# Patient Record
Sex: MALE | Race: WHITE | NOT HISPANIC OR LATINO | Employment: FULL TIME | URBAN - METROPOLITAN AREA
[De-identification: names, ages, dates, MRNs, and addresses within clinical notes are randomized per-mention and may not be internally consistent; named-entity substitution may affect disease eponyms.]

---

## 2018-12-17 ENCOUNTER — OFFICE VISIT (OUTPATIENT)
Dept: URGENT CARE | Facility: CLINIC | Age: 23
End: 2018-12-17
Payer: OTHER GOVERNMENT

## 2018-12-17 VITALS
HEIGHT: 69 IN | RESPIRATION RATE: 16 BRPM | DIASTOLIC BLOOD PRESSURE: 78 MMHG | SYSTOLIC BLOOD PRESSURE: 122 MMHG | BODY MASS INDEX: 25.03 KG/M2 | WEIGHT: 169 LBS | OXYGEN SATURATION: 97 % | TEMPERATURE: 98.5 F | HEART RATE: 72 BPM

## 2018-12-17 DIAGNOSIS — J98.8 RESPIRATORY TRACT INFECTION: ICD-10-CM

## 2018-12-17 PROCEDURE — 99203 OFFICE O/P NEW LOW 30 MIN: CPT | Performed by: NURSE PRACTITIONER

## 2018-12-17 RX ORDER — AZITHROMYCIN 250 MG/1
TABLET, FILM COATED ORAL
Qty: 6 TAB | Refills: 0 | Status: SHIPPED | OUTPATIENT
Start: 2018-12-17 | End: 2019-05-15

## 2018-12-17 ASSESSMENT — ENCOUNTER SYMPTOMS
MYALGIAS: 0
WHEEZING: 0
NAUSEA: 0
CHILLS: 0
VOMITING: 0
BLURRED VISION: 0
DIARRHEA: 0
DIZZINESS: 0
HEADACHES: 0
DOUBLE VISION: 0
SORE THROAT: 1
MUSCULOSKELETAL NEGATIVE: 1
COUGH: 1
SHORTNESS OF BREATH: 0
ABDOMINAL PAIN: 0
STRIDOR: 0
PALPITATIONS: 0
CONSTIPATION: 0
FEVER: 0

## 2018-12-17 NOTE — LETTER
December 17, 2018         Patient: Az Morris   YOB: 1995   Date of Visit: 12/17/2018           To Whom it May Concern:    Az Morris was seen in my clinic on 12/17/2018 due to illness. He may return to work on 12/20/2018.  If you have any questions or concerns, please don't hesitate to call.        Sincerely,           BLAIRE Scott.  Electronically Signed

## 2018-12-17 NOTE — PROGRESS NOTES
"Subjective:   Az Morris is a 23 y.o. male who presents for Cold Symptoms (body aches, sore throat, x1wk) and Letter for School/Work (needs excuse for work)        HPI   Patient presents with new onset sore throat, congestion, and cough that started week ago. He states the cough is intermittent, productive with yellow sputum. No worsening with timing. Mild, constant sore throat. Denies taking any OTC cold remedies. Denies alleviating or aggravating factors.      Has received flu vaccine this season.    Denies any medical hx.    Review of Systems   Constitutional: Negative for chills and fever.   HENT: Positive for congestion and sore throat. Negative for ear discharge and ear pain.    Eyes: Negative for blurred vision and double vision.   Respiratory: Positive for cough. Negative for shortness of breath, wheezing and stridor.    Cardiovascular: Negative for chest pain and palpitations.   Gastrointestinal: Negative for abdominal pain, constipation, diarrhea, nausea and vomiting.   Musculoskeletal: Negative.  Negative for myalgias.   Skin: Negative.  Negative for itching and rash.   Neurological: Negative for dizziness and headaches.   All other systems reviewed and are negative.    PMH:  has no past medical history on file.  MEDS:   Current Outpatient Prescriptions:   •  azithromycin (ZITHROMAX) 250 MG Tab, Take two tabs po day one followed by one tab po day two through five with food, Disp: 6 Tab, Rfl: 0  ALLERGIES:   Allergies   Allergen Reactions   • Penicillins      rash     SURGHX: History reviewed. No pertinent surgical history.  SOCHX:  reports that he has never smoked. His smokeless tobacco use includes Chew. He reports that he does not drink alcohol.  FH: Family history was reviewed, no pertinent findings to report       Objective:   /78 (BP Location: Left arm, Patient Position: Sitting, BP Cuff Size: Adult)   Pulse 72   Temp 36.9 °C (98.5 °F)   Resp 16   Ht 1.753 m (5' 9\")   Wt 76.7 kg (169 " lb)   SpO2 97%   BMI 24.96 kg/m²   Physical Exam   Constitutional: He is oriented to person, place, and time. He appears well-developed and well-nourished. No distress.   HENT:   Head: Normocephalic.   Right Ear: Hearing and ear canal normal. Tympanic membrane is not erythematous. A middle ear effusion is present.   Left Ear: Hearing and ear canal normal. Tympanic membrane is not erythematous. A middle ear effusion is present.   Nose: No rhinorrhea. Right sinus exhibits no maxillary sinus tenderness and no frontal sinus tenderness. Left sinus exhibits no maxillary sinus tenderness and no frontal sinus tenderness.   Mouth/Throat: Mucous membranes are normal. Posterior oropharyngeal erythema present. Tonsils are 2+ on the right. Tonsils are 2+ on the left. No tonsillar exudate.   Eyes: Pupils are equal, round, and reactive to light. Conjunctivae, EOM and lids are normal.   Neck: Normal range of motion. No thyromegaly present.   Cardiovascular: Normal rate, regular rhythm and normal heart sounds.    Pulmonary/Chest: Effort normal. No respiratory distress. He has no decreased breath sounds. He has wheezes in the left lower field.   Able to clear wheezing with cough   Abdominal: Soft. Bowel sounds are normal. There is no hepatosplenomegaly.   Lymphadenopathy:        Head (right side): No submandibular and no tonsillar adenopathy present.        Head (left side): No submandibular and no tonsillar adenopathy present.     He has no cervical adenopathy.   Neurological: He is alert and oriented to person, place, and time.   Skin: Skin is warm and dry. No rash noted. He is not diaphoretic.   Psychiatric: He has a normal mood and affect. His behavior is normal. Judgment and thought content normal.   Vitals reviewed.        Assessment/Plan:   Assessment    1. Respiratory tract infection  - azithromycin (ZITHROMAX) 250 MG Tab; Take two tabs po day one followed by one tab po day two through five with food  Dispense: 6 Tab;  Refill: 0    Patient encouraged to increase clear liquid intake    Denies need for inhaler    Differential diagnosis, natural history, supportive care, and indications for immediate follow-up discussed.

## 2019-05-10 ENCOUNTER — OFFICE VISIT (OUTPATIENT)
Dept: URGENT CARE | Facility: PHYSICIAN GROUP | Age: 24
End: 2019-05-10
Payer: OTHER GOVERNMENT

## 2019-05-10 VITALS
TEMPERATURE: 99.5 F | BODY MASS INDEX: 25.53 KG/M2 | HEIGHT: 69 IN | OXYGEN SATURATION: 99 % | HEART RATE: 62 BPM | WEIGHT: 172.4 LBS | SYSTOLIC BLOOD PRESSURE: 130 MMHG | DIASTOLIC BLOOD PRESSURE: 80 MMHG

## 2019-05-10 DIAGNOSIS — T14.8XXA MUSCLE STRAIN: ICD-10-CM

## 2019-05-10 PROCEDURE — 99214 OFFICE O/P EST MOD 30 MIN: CPT | Performed by: PHYSICIAN ASSISTANT

## 2019-05-11 ASSESSMENT — ENCOUNTER SYMPTOMS
FOCAL WEAKNESS: 0
TREMORS: 0
CHILLS: 0
SHORTNESS OF BREATH: 0
SEIZURES: 0
SPEECH CHANGE: 0
BLURRED VISION: 0
LOSS OF CONSCIOUSNESS: 0
TINGLING: 0
HEADACHES: 0
PALPITATIONS: 0
COUGH: 0
DIZZINESS: 0
SENSORY CHANGE: 0
FEVER: 0
DOUBLE VISION: 0
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
## 2019-05-15 ENCOUNTER — OFFICE VISIT (OUTPATIENT)
Dept: URGENT CARE | Facility: PHYSICIAN GROUP | Age: 24
End: 2019-05-15
Payer: OTHER GOVERNMENT

## 2019-05-15 VITALS
WEIGHT: 172 LBS | SYSTOLIC BLOOD PRESSURE: 132 MMHG | RESPIRATION RATE: 16 BRPM | DIASTOLIC BLOOD PRESSURE: 78 MMHG | BODY MASS INDEX: 25.48 KG/M2 | TEMPERATURE: 98.3 F | HEART RATE: 80 BPM | HEIGHT: 69 IN | OXYGEN SATURATION: 99 %

## 2019-05-15 DIAGNOSIS — B00.1 COLD SORE: Primary | ICD-10-CM

## 2019-05-15 PROCEDURE — 99214 OFFICE O/P EST MOD 30 MIN: CPT | Performed by: PHYSICIAN ASSISTANT

## 2019-05-15 RX ORDER — VALACYCLOVIR HYDROCHLORIDE 1 G/1
2000 TABLET, FILM COATED ORAL 2 TIMES DAILY
Qty: 4 TAB | Refills: 2 | Status: SHIPPED | OUTPATIENT
Start: 2019-05-15 | End: 2019-05-16

## 2019-05-15 ASSESSMENT — ENCOUNTER SYMPTOMS
NAUSEA: 0
SHORTNESS OF BREATH: 0
DIARRHEA: 0
VOMITING: 0
CHILLS: 0
ABDOMINAL PAIN: 0
CONSTIPATION: 0
FEVER: 0
COUGH: 0

## 2019-05-16 NOTE — PATIENT INSTRUCTIONS
Valacyclovir caplets  What is this medicine?  VALACYCLOVIR (mariluz ay EVELYN saez veer) is an antiviral medicine. It is used to treat or prevent infections caused by certain kinds of viruses. Examples of these infections include herpes and shingles. This medicine will not cure herpes.  This medicine may be used for other purposes; ask your health care provider or pharmacist if you have questions.  COMMON BRAND NAME(S): Valtrex  What should I tell my health care provider before I take this medicine?  They need to know if you have any of these conditions:  -acquired immunodeficiency syndrome (AIDS)  -any other condition that may weaken the immune system  -bone marrow or kidney transplant  -kidney disease  -an unusual or allergic reaction to valacyclovir, acyclovir, ganciclovir, valganciclovir, other medicines, foods, dyes, or preservatives  -pregnant or trying to get pregnant  -breast-feeding  How should I use this medicine?  Take this medicine by mouth with a glass of water. Follow the directions on the prescription label. You can take this medicine with or without food. Take your doses at regular intervals. Do not take your medicine more often than directed. Finish the full course prescribed by your doctor or health care professional even if you think your condition is better. Do not stop taking except on the advice of your doctor or health care professional.  Talk to your pediatrician regarding the use of this medicine in children. While this drug may be prescribed for children as young as 2 years for selected conditions, precautions do apply.  Overdosage: If you think you have taken too much of this medicine contact a poison control center or emergency room at once.  NOTE: This medicine is only for you. Do not share this medicine with others.  What if I miss a dose?  If you miss a dose, take it as soon as you can. If it is almost time for your next dose, take only that dose. Do not take double or extra doses.  What may  interact with this medicine?  -cimetidine  -probenecid  This list may not describe all possible interactions. Give your health care provider a list of all the medicines, herbs, non-prescription drugs, or dietary supplements you use. Also tell them if you smoke, drink alcohol, or use illegal drugs. Some items may interact with your medicine.  What should I watch for while using this medicine?  Tell your doctor or health care professional if your symptoms do not start to get better after 1 week.  This medicine works best when taken early in the course of an infection, within the first 72 hours. Begin treatment as soon as possible after the first signs of infection like tingling, itching, or pain in the affected area.  It is possible that genital herpes may still be spread even when you are not having symptoms. Always use safer sex practices like condoms made of latex or polyurethane whenever you have sexual contact.  You should stay well hydrated while taking this medicine. Drink plenty of fluids.  What side effects may I notice from receiving this medicine?  Side effects that you should report to your doctor or health care professional as soon as possible:  -allergic reactions like skin rash, itching or hives, swelling of the face, lips, or tongue  -aggressive behavior  -confusion  -hallucinations  -problems with balance, talking, walking  -stomach pain  -tremor  -trouble passing urine or change in the amount of urine  Side effects that usually do not require medical attention (report to your doctor or health care professional if they continue or are bothersome):  -dizziness  -headache  -nausea, vomiting  This list may not describe all possible side effects. Call your doctor for medical advice about side effects. You may report side effects to FDA at 0-060-FDA-9393.  Where should I keep my medicine?  Keep out of the reach of children.  Store at room temperature between 15 and 25 degrees C (59 and 77 degrees F). Keep  container tightly closed. Throw away any unused medicine after the expiration date.  NOTE: This sheet is a summary. It may not cover all possible information. If you have questions about this medicine, talk to your doctor, pharmacist, or health care provider.  © 2018 [employer] (2013-12-03 16:34:05)

## 2019-05-16 NOTE — PROGRESS NOTES
"Subjective:   Az Morris is a 24 y.o. male who presents for Cold Sores (Out of medicine.  Valcyclovir)        The patient presents to clinic today for a refill on his Valtrex.  He has a history of cold sores well-controlled with Valtrex in the past.  He denies complication with medication.  He does not currently have a cold sore.  No fever, chills.  No other aggravating or alleviating factors.      Review of Systems   Constitutional: Negative for chills, fever and malaise/fatigue.   Respiratory: Negative for cough and shortness of breath.    Gastrointestinal: Negative for abdominal pain, constipation, diarrhea, nausea and vomiting.   Skin: Negative for itching and rash.   All other systems reviewed and are negative.      PMH:  has no past medical history on file.  MEDS:   Current Outpatient Prescriptions:   •  valacyclovir (VALTREX) 1 GM Tab, Take 2 Tabs by mouth 2 times a day for 1 day., Disp: 4 Tab, Rfl: 2  ALLERGIES:   Allergies   Allergen Reactions   • Penicillins      rash     SURGHX: History reviewed. No pertinent surgical history.  SOCHX:  reports that he has never smoked. His smokeless tobacco use includes Chew. He reports that he drinks alcohol.  History reviewed. No pertinent family history.     Objective:   /78   Pulse 80   Temp 36.8 °C (98.3 °F) (Temporal)   Resp 16   Ht 1.753 m (5' 9\")   Wt 78 kg (172 lb)   SpO2 99%   BMI 25.40 kg/m²     Physical Exam   Constitutional: He is oriented to person, place, and time. He appears well-developed and well-nourished. No distress.   HENT:   Head: Normocephalic and atraumatic.   Nose: Nose normal.   Mouth/Throat: Uvula is midline, oropharynx is clear and moist and mucous membranes are normal. No posterior oropharyngeal erythema.   Eyes: Pupils are equal, round, and reactive to light. Conjunctivae are normal.   Neck: Normal range of motion. Neck supple. No tracheal deviation present.   Cardiovascular: Normal rate and regular rhythm.  "   Pulmonary/Chest: Effort normal and breath sounds normal.   Lymphadenopathy:     He has no cervical adenopathy.   Neurological: He is alert and oriented to person, place, and time.   Skin: Skin is warm and dry. Capillary refill takes less than 2 seconds.   Psychiatric: He has a normal mood and affect. His behavior is normal.   Vitals reviewed.        Assessment/Plan:     1. Cold sore  valacyclovir (VALTREX) 1 GM Tab    REFERRAL TO FOLLOW-UP WITH PRIMARY CARE     Medication refill provided.  Educational handout on Valtrex provided.  We discussed long-term standing order will require PCP orders.  A referral was placed to follow-up and establish with PCP.      If symptoms worsen or persist patient can return to clinic for reevaluation. All side effects of medication discussed including allergic response, GI upset, tendon injury, etc. Patient verbalized understanding of information.    Please note that this dictation was created using voice recognition software. I have made every reasonable attempt to correct obvious errors, but I expect that there are errors of grammar and possibly content that I did not discover before finalizing the note.

## 2020-09-16 ENCOUNTER — TELEPHONE (OUTPATIENT)
Dept: SCHEDULING | Facility: IMAGING CENTER | Age: 25
End: 2020-09-16

## 2020-11-03 ENCOUNTER — OFFICE VISIT (OUTPATIENT)
Dept: MEDICAL GROUP | Facility: MEDICAL CENTER | Age: 25
End: 2020-11-03
Payer: OTHER GOVERNMENT

## 2020-11-03 VITALS
HEIGHT: 69 IN | TEMPERATURE: 98.7 F | HEART RATE: 61 BPM | DIASTOLIC BLOOD PRESSURE: 84 MMHG | BODY MASS INDEX: 23.84 KG/M2 | RESPIRATION RATE: 14 BRPM | WEIGHT: 160.94 LBS | SYSTOLIC BLOOD PRESSURE: 124 MMHG | OXYGEN SATURATION: 100 %

## 2020-11-03 DIAGNOSIS — M25.562 CHRONIC PAIN OF BOTH KNEES: ICD-10-CM

## 2020-11-03 DIAGNOSIS — G89.29 CHRONIC PAIN OF BOTH KNEES: ICD-10-CM

## 2020-11-03 DIAGNOSIS — Z72.0 TOBACCO USE: ICD-10-CM

## 2020-11-03 DIAGNOSIS — M54.41 CHRONIC BILATERAL LOW BACK PAIN WITH BILATERAL SCIATICA: ICD-10-CM

## 2020-11-03 DIAGNOSIS — M25.561 CHRONIC PAIN OF BOTH KNEES: ICD-10-CM

## 2020-11-03 DIAGNOSIS — M54.42 CHRONIC BILATERAL LOW BACK PAIN WITH BILATERAL SCIATICA: ICD-10-CM

## 2020-11-03 DIAGNOSIS — G89.29 CHRONIC BILATERAL LOW BACK PAIN WITH BILATERAL SCIATICA: ICD-10-CM

## 2020-11-03 PROBLEM — M54.40 CHRONIC BILATERAL LOW BACK PAIN WITH SCIATICA: Status: ACTIVE | Noted: 2020-11-03

## 2020-11-03 PROCEDURE — 99204 OFFICE O/P NEW MOD 45 MIN: CPT | Performed by: FAMILY MEDICINE

## 2020-11-03 SDOH — HEALTH STABILITY: MENTAL HEALTH: HOW MANY STANDARD DRINKS CONTAINING ALCOHOL DO YOU HAVE ON A TYPICAL DAY?: 1 OR 2

## 2020-11-03 SDOH — HEALTH STABILITY: MENTAL HEALTH: HOW OFTEN DO YOU HAVE A DRINK CONTAINING ALCOHOL?: 2-4 TIMES A MONTH

## 2020-11-03 ASSESSMENT — PATIENT HEALTH QUESTIONNAIRE - PHQ9: CLINICAL INTERPRETATION OF PHQ2 SCORE: 0

## 2020-11-03 NOTE — PROGRESS NOTES
Subjective:     CC:  Diagnoses of Tobacco use, Chronic bilateral low back pain with bilateral sciatica, and Chronic pain of both knees were pertinent to this visit.    HISTORY OF THE PRESENT ILLNESS: Patient is a 25 y.o. male. He is here today to establish care. He works for [employer], has a girlfriend, no children.  No family history of colon or prostate cancer.      Tobacco use  Patient started chewing about 5 years ago,  Patient quit for about 6 months about two years ago. He quit cold turkey. He is not interested in quitting at this time.    Chronic bilateral low back pain with sciatica  This is a chronic problem.  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  He reports intermittent, bilateral, aching pain since that time. He is currently using ibuprofen which provides some relief.  He reports the pain has radiated down both legs before however this has not happened recently.  He denies lower extremity weakness or numbness.  No bowel or bladder incontinence.    Chronic pain of both knees  Chronic bilateral knee pain X 2 years. He reports the pain started during training for the [employer]. He reports intermittent aching pain, occasional sharp, stabbing pain when he is exercising.  He denies catching, clicking, or locking.  No previous imaging or physical therapy.  His pain is relieved with ibuprofen.      Allergies: Penicillins    No current [facility]-ordered outpatient medications on file.     No current [facility]-ordered facility-administered medications on file.        History reviewed. No pertinent past medical history.    Past Surgical History:   Procedure Laterality Date   • DENTAL EXTRACTION(S)      wisdom teeth       Social History     Tobacco Use   • Smoking status: Former Smoker     Types: Cigarettes   • Smokeless tobacco: Current  "User     Types: Chew   Substance Use Topics   • Alcohol use: Yes     Frequency: 2-4 times a month     Drinks per session: 1 or 2     Comment: Occ   • Drug use: Never       Social History     Social History Narrative   • Not on file       Family History   Problem Relation Age of Onset   • No Known Problems Mother    • No Known Problems Father        Health Maintenance: See above    ROS:   Gen: no fevers/chills, no changes in weight  Eyes: no changes in vision  ENT: no sore throat or nasal congestion  Pulm: no sob, no cough  CV: no chest pain  GI: no nausea/vomiting, no diarrhea or constipation  : no dysuria  MSk: see above  Skin: no rash  Neuro: no headaches, no numbness/tingling  Immuno: no seasonal allergies  Heme/Lymph: no easy bruising  Psych: no anxiety of depression      Objective:     Exam: /84 (BP Location: Left arm, Patient Position: Sitting, BP Cuff Size: Adult long)   Pulse 61   Temp 37.1 °C (98.7 °F) (Temporal)   Resp 14   Ht 1.753 m (5' 9\")   Wt 73 kg (160 lb 15 oz)   SpO2 100%  Body mass index is 23.77 kg/m².    General: Well-developed, well-nourished. Appears stated age.  Eyes: Normocephalic. Conjunctiva clear without injection or scleral icterus. Pupils equal and reactive to light.   HENT: Normocephalic, atraumatic. Ears normal shape and contour, canals are clear bilaterally, tympanic membranes pearly, oropharynx is clear without erythema, edema or exudates.   Neck: Supple; no obvious thyromegaly or nodules appreciated  Pulmonary: Clear to ausculation bilaterally.  No increased work of breathing. No rales, ronchi, or wheezing.  Cardiovascular: Regular rate and rhythm without murmur.  Abdomen: Soft, nontender, nondistended. Normal bowel sounds. No guarding or rebound tenderness.  Neurologic: CN III-XII intact.  Lymph: No cervical or supraclavicular lymphadenopathy palpated.  Skin: Warm and dry.  No obvious lesions.  Musculoskeletal: Normal gait. No extremity cyanosis, clubbing, or " edema.  Back: No spinal tenderness  Knees:  no edema, ecchymosis, or lesions on inspection, no effusion noted, no tenderness to palpation. Full ROM with flexion and extension, strength 5/5 on extension, strength 5/5 on flexion. No pain with varus/valgus stress, Neg anterior drawer, Neg posterior drawer, Neg Lachman test  Psych: Euthymic affect. Alert and oriented x 3. Judgment and insight is normal.      Assessment & Plan:   25 y.o. male with the following -    1. Tobacco use  - Cessation counseling provided, patient is not considering quitting at this time    2. Chronic bilateral low back pain with bilateral sciatica  - REFERRAL TO PHYSICAL THERAPY  - DX-LUMBAR SPINE-2 OR 3 VIEWS; Future  - DX-THORACIC SPINE-2 VIEWS; Future  - Continue NSAIDS PRN, discussed GI precautions    3. Chronic pain of both knees  - REFERRAL TO PHYSICAL THERAPY  - DX-KNEE COMPLETE 4+ LEFT; Future  - DX-KNEE COMPLETE 4+ RIGHT; Future  - Continue NSAIDS PRN, discussed GI precautions    Return in about 6 weeks (around 12/15/2020) for back and knee pain.    Please note this dictation was created using voice recognition software. I have made every reasonable attempt to correct obvious errors, but I expect there may be errors of grammar, and possibly content, that I did not discover before finalizing the note.

## 2020-11-03 NOTE — ASSESSMENT & PLAN NOTE
This is a chronic problem.  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  He reports intermittent, bilateral, aching pain since that time. He is currently using ibuprofen which provides some relief.  He reports the pain has radiated down both legs before however this has not happened recently.  He denies lower extremity weakness or numbness.  No bowel or bladder incontinence.

## 2020-11-03 NOTE — ASSESSMENT & PLAN NOTE
Chronic bilateral knee pain X 2 years. He reports the pain started during training for the [employer]. He reports intermittent aching pain, occasional sharp, stabbing pain when he is exercising.  He denies catching, clicking, or locking.  No previous imaging or physical therapy.  His pain is relieved with ibuprofen.

## 2020-11-03 NOTE — ASSESSMENT & PLAN NOTE
Patient started chewing about 5 years ago,  Patient quit for about 6 months about two years ago. He quit cold turkey. He is not interested in quitting at this time.

## 2020-11-10 ENCOUNTER — HOSPITAL ENCOUNTER (OUTPATIENT)
Dept: RADIOLOGY | Facility: MEDICAL CENTER | Age: 25
End: 2020-11-10
Attending: FAMILY MEDICINE
Payer: OTHER GOVERNMENT

## 2020-11-10 DIAGNOSIS — M54.41 CHRONIC BILATERAL LOW BACK PAIN WITH BILATERAL SCIATICA: ICD-10-CM

## 2020-11-10 DIAGNOSIS — G89.29 CHRONIC PAIN OF BOTH KNEES: ICD-10-CM

## 2020-11-10 DIAGNOSIS — G89.29 CHRONIC BILATERAL LOW BACK PAIN WITH BILATERAL SCIATICA: ICD-10-CM

## 2020-11-10 DIAGNOSIS — M25.562 CHRONIC PAIN OF BOTH KNEES: ICD-10-CM

## 2020-11-10 DIAGNOSIS — M54.42 CHRONIC BILATERAL LOW BACK PAIN WITH BILATERAL SCIATICA: ICD-10-CM

## 2020-11-10 DIAGNOSIS — M25.561 CHRONIC PAIN OF BOTH KNEES: ICD-10-CM

## 2020-11-10 PROCEDURE — 72100 X-RAY EXAM L-S SPINE 2/3 VWS: CPT

## 2020-11-10 PROCEDURE — 72070 X-RAY EXAM THORAC SPINE 2VWS: CPT

## 2020-11-10 PROCEDURE — 73564 X-RAY EXAM KNEE 4 OR MORE: CPT | Mod: LT

## 2020-11-10 PROCEDURE — 73564 X-RAY EXAM KNEE 4 OR MORE: CPT | Mod: RT

## 2020-11-18 DIAGNOSIS — M54.42 CHRONIC BILATERAL LOW BACK PAIN WITH BILATERAL SCIATICA: ICD-10-CM

## 2020-11-18 DIAGNOSIS — M54.41 CHRONIC BILATERAL LOW BACK PAIN WITH BILATERAL SCIATICA: ICD-10-CM

## 2020-11-18 DIAGNOSIS — G89.29 CHRONIC BILATERAL LOW BACK PAIN WITH BILATERAL SCIATICA: ICD-10-CM

## 2020-12-16 ENCOUNTER — TELEMEDICINE (OUTPATIENT)
Dept: MEDICAL GROUP | Facility: MEDICAL CENTER | Age: 25
End: 2020-12-16
Payer: OTHER GOVERNMENT

## 2020-12-16 VITALS — WEIGHT: 155 LBS | BODY MASS INDEX: 22.96 KG/M2 | HEIGHT: 69 IN

## 2020-12-16 DIAGNOSIS — G89.29 CHRONIC BILATERAL LOW BACK PAIN WITH BILATERAL SCIATICA: ICD-10-CM

## 2020-12-16 DIAGNOSIS — M54.41 CHRONIC BILATERAL LOW BACK PAIN WITH BILATERAL SCIATICA: ICD-10-CM

## 2020-12-16 DIAGNOSIS — G89.29 CHRONIC PAIN OF BOTH KNEES: ICD-10-CM

## 2020-12-16 DIAGNOSIS — M25.561 CHRONIC PAIN OF BOTH KNEES: ICD-10-CM

## 2020-12-16 DIAGNOSIS — M54.42 CHRONIC BILATERAL LOW BACK PAIN WITH BILATERAL SCIATICA: ICD-10-CM

## 2020-12-16 DIAGNOSIS — M25.562 CHRONIC PAIN OF BOTH KNEES: ICD-10-CM

## 2020-12-16 PROCEDURE — 99999 PR NO CHARGE: CPT | Mod: CR | Performed by: FAMILY MEDICINE

## 2020-12-16 NOTE — PROGRESS NOTES
"Telemedicine Visit: Established Patient     This evaluation was conducted via Zoom using secure and encrypted videoconferencing technology. The patient was in a private location in the state of [place].    The patient's identity was confirmed and verbal consent was obtained for this virtual visit.    Subjective:   CC: f/u back and knee pain  Az Morris is a 25 y.o. male presenting for evaluation and management of:    The patient presents for follow-up for chronic low back and bilateral knee pain.  His symptoms have not changed at all since her last appointment.  Xrays of lumbar spine and bilateral knees 11/10/20 unremarkable. Unfortunately he did not receive a return call from the physical therapy office for 2 weeks and has this not participated in physical therapy.  He would like to try physical therapy and schedule a visit in a month to evaluate improvement in symptoms s/p therapy.      Allergies   Allergen Reactions   • Penicillins      rash       Current medicines (including changes today)  No current outpatient medications on file.     No current facility-administered medications for this visit.        Patient Active Problem List    Diagnosis Date Noted   • Tobacco use 11/03/2020   • Chronic bilateral low back pain with sciatica 11/03/2020   • Chronic pain of both knees 11/03/2020       Family History   Problem Relation Age of Onset   • No Known Problems Mother    • No Known Problems Father        He  has no past medical history on file.  He  has a past surgical history that includes dental extraction(s).       Objective:   Ht 1.753 m (5' 9\") Comment: pt. reported  Wt 70.3 kg (155 lb) Comment: pt. reported  BMI 22.89 kg/m²     Physical Exam:  Constitutional: Alert, no distress, well-groomed.  Skin: No rashes in visible areas.  Eye: Round. Conjunctiva clear, lids normal. No icterus.   ENMT: Lips pink without lesions, good dentition, moist mucous membranes. Phonation normal.  Respiratory: Unlabored " respiratory effort, no cough or audible wheeze  Psych: Alert and oriented x3, normal affect and mood.       Assessment and Plan:   The following treatment plan was discussed:     1. Chronic bilateral low back pain with bilateral sciatica  2. Chronic pain of both knees  No changes since last appointment, patient has not started PT yet, first appointment in one week. Xrays unremarkable 11/10/20. We will follow up after patient has completed at least 5-6 sessions PT to evaluate for improvement. He continues to manage well with PRN NSAIDS.    *I do not feel patient should be billed for this visit given brevity and no new recommendations regarding treatment plan, will bill no charge.        Follow-up: Return in about 1 month (around 1/16/2021).

## 2021-02-09 ENCOUNTER — TELEMEDICINE (OUTPATIENT)
Dept: MEDICAL GROUP | Facility: MEDICAL CENTER | Age: 26
End: 2021-02-09
Payer: OTHER GOVERNMENT

## 2021-02-09 VITALS — HEART RATE: 72 BPM | BODY MASS INDEX: 23.7 KG/M2 | WEIGHT: 160 LBS | HEIGHT: 69 IN

## 2021-02-09 DIAGNOSIS — G89.29 CHRONIC PAIN OF BOTH KNEES: ICD-10-CM

## 2021-02-09 DIAGNOSIS — M54.42 CHRONIC BILATERAL LOW BACK PAIN WITH BILATERAL SCIATICA: ICD-10-CM

## 2021-02-09 DIAGNOSIS — M25.562 CHRONIC PAIN OF BOTH KNEES: ICD-10-CM

## 2021-02-09 DIAGNOSIS — G89.29 CHRONIC BILATERAL LOW BACK PAIN WITH BILATERAL SCIATICA: ICD-10-CM

## 2021-02-09 DIAGNOSIS — Z72.0 TOBACCO USE: ICD-10-CM

## 2021-02-09 DIAGNOSIS — M25.561 CHRONIC PAIN OF BOTH KNEES: ICD-10-CM

## 2021-02-09 DIAGNOSIS — M54.41 CHRONIC BILATERAL LOW BACK PAIN WITH BILATERAL SCIATICA: ICD-10-CM

## 2021-02-09 PROCEDURE — 99213 OFFICE O/P EST LOW 20 MIN: CPT | Mod: CR | Performed by: FAMILY MEDICINE

## 2021-02-09 ASSESSMENT — PATIENT HEALTH QUESTIONNAIRE - PHQ9: CLINICAL INTERPRETATION OF PHQ2 SCORE: 0

## 2021-02-09 NOTE — PROGRESS NOTES
Telemedicine Visit: Established Patient     This evaluation was conducted via Zoom using secure and encrypted videoconferencing technology. The patient was in a private location in the state of [place].    The patient's identity was confirmed and verbal consent was obtained for this virtual visit.     Subjective:   CC:   Az Morris is a 25 y.o. male presenting for evaluation and management of:    Chronic bilateral low back pain with sciatica  This is a chronic problem.  The patient's back pain also started while training for the [employer].  He was carrying 120 pound backpack's for 5 miles most days a week.  Since that time he has had intermittent, bilateral, aching pain with associated bilateral sciatica.  He reports physical therapy is improving his symptoms.    Lumbar MRI reportedly normal through [facility].       Tobacco use  Patient started chewing about 5 years ago,  Patient quit for about 6 months about two years ago. He quit cold turkey. He is contemplating quitting as he is noticing receding gums.     Chronic pain of both knees  This is a chronic problem for the past 2 years.  The patient reports his pain started during training for the [employer].  X-rays were unremarkable.  The patient has some as she is starting physical therapy however has finally been able to see a physical therapist through [facility]. He reports his pain has improved after one PT treatment.      ROS   Denies any recent fevers or chills. No nausea or vomiting. No chest pains or shortness of breath.     Allergies   Allergen Reactions   • Penicillins      rash       Current medicines (including changes today)  No current outpatient medications on file.     No current facility-administered medications for this visit.        Patient Active Problem List    Diagnosis Date Noted   • Tobacco use 11/03/2020   • Chronic bilateral low back pain with sciatica 11/03/2020   • Chronic pain of both knees 11/03/2020       Family History  "  Problem Relation Age of Onset   • No Known Problems Mother    • No Known Problems Father        He  has no past medical history on file.  He  has a past surgical history that includes dental extraction(s).       Objective:   Pulse 72 Comment: pt stated  Ht 1.753 m (5' 9\") Comment: pt stated  Wt 72.6 kg (160 lb) Comment: pt stated  BMI 23.63 kg/m²     Physical Exam:  Constitutional: Alert, no distress, well-groomed.  Skin: No rashes in visible areas.  Eye: Round. Conjunctiva clear, lids normal. No icterus.   ENMT: Lips pink without lesions, good dentition, moist mucous membranes. Phonation normal.  Respiratory: Unlabored respiratory effort, no cough or audible wheeze  Psych: Alert and oriented x3, normal affect and mood.       Assessment and Plan:   The following treatment plan was discussed:     1. Chronic bilateral low back pain with bilateral sciatica  This is a chronic problem.  - Continue physical therapy  - Requesting records from [facility]    2. Tobacco use  This is a chronic problem. Patient in contemplative stage. Brief counseling provided. Long discussion regarding possible treatment and supportive options. He is not interested at this time and will contact me if he would like to explore options further.    3. Chronic pain of both knees  This is a chronic problem.  - Continue physical therapy  - Requesting records from [facility]    Follow-up: Return in about 3 months (around 5/9/2021) for tobacco cessation.          "

## 2021-02-09 NOTE — ASSESSMENT & PLAN NOTE
This is a chronic problem.  The patient's back pain also started while training for the [employer].  He was carrying 120 pound backpack's for 5 miles most days a week.  Since that time he has had intermittent, bilateral, aching pain with associated bilateral sciatica.  He reports physical therapy is improving his symptoms.    Lumbar MRI reportedly normal through [facility].

## 2021-02-09 NOTE — ASSESSMENT & PLAN NOTE
Patient started chewing about 5 years ago,  Patient quit for about 6 months about two years ago. He quit cold turkey. He is contemplating quitting as he is noticing receding gums.

## 2021-02-09 NOTE — ASSESSMENT & PLAN NOTE
This is a chronic problem for the past 2 years.  The patient reports his pain started during training for the [employer].  X-rays were unremarkable.  The patient has some as she is starting physical therapy however has finally been able to see a physical therapist through [facility]. He reports his pain has improved after one PT treatment.

## 2021-06-02 ENCOUNTER — TELEMEDICINE (OUTPATIENT)
Dept: MEDICAL GROUP | Facility: MEDICAL CENTER | Age: 26
End: 2021-06-02
Payer: OTHER GOVERNMENT

## 2021-06-02 VITALS — WEIGHT: 155 LBS | HEIGHT: 69 IN | BODY MASS INDEX: 22.96 KG/M2

## 2021-06-02 DIAGNOSIS — F32.0 CURRENT MILD EPISODE OF MAJOR DEPRESSIVE DISORDER WITHOUT PRIOR EPISODE (HCC): ICD-10-CM

## 2021-06-02 PROCEDURE — 99213 OFFICE O/P EST LOW 20 MIN: CPT | Mod: GT,CR | Performed by: FAMILY MEDICINE

## 2021-06-02 ASSESSMENT — ANXIETY QUESTIONNAIRES
5. BEING SO RESTLESS THAT IT IS HARD TO SIT STILL: MORE THAN HALF THE DAYS
3. WORRYING TOO MUCH ABOUT DIFFERENT THINGS: SEVERAL DAYS
4. TROUBLE RELAXING: MORE THAN HALF THE DAYS
1. FEELING NERVOUS, ANXIOUS, OR ON EDGE: SEVERAL DAYS
6. BECOMING EASILY ANNOYED OR IRRITABLE: SEVERAL DAYS
2. NOT BEING ABLE TO STOP OR CONTROL WORRYING: SEVERAL DAYS
GAD7 TOTAL SCORE: 8
7. FEELING AFRAID AS IF SOMETHING AWFUL MIGHT HAPPEN: NOT AT ALL

## 2021-06-02 ASSESSMENT — PATIENT HEALTH QUESTIONNAIRE - PHQ9
5. POOR APPETITE OR OVEREATING: 0 - NOT AT ALL
SUM OF ALL RESPONSES TO PHQ QUESTIONS 1-9: 7
CLINICAL INTERPRETATION OF PHQ2 SCORE: 3

## 2021-06-03 NOTE — PROGRESS NOTES
Telemedicine Visit: Established Patient     This evaluation was conducted via Zoom using secure and encrypted videoconferencing technology. The patient was in a private location in the state of [place].    The patient's identity was confirmed and verbal consent was obtained for this virtual visit.    Subjective:   CC: requesting referral to psychiatry  Az Morris is a 26 y.o. male presenting for evaluation and management of:    Current mild episode of major depressive disorder without prior episode (HCC)  Az reports he has been feeling apathetic and depressed for the past couple months. He reports his mother has been struggling with her mental health which has been stressful for the patient. He is also working long hours and has minimal time for himself. He requests referral to Dr. Star Alegria with [facility]. He is not interested in medications.    Depression Screening    Little interest or pleasure in doing things?  2 - more than half the days   Feeling down, depressed , or hopeless? 1 - several days   Trouble falling or staying asleep, or sleeping too much?  1 - several days   Feeling tired or having little energy?  2 - more than half the days   Poor appetite or overeating?  0 - not at all   Feeling bad about yourself - or that you are a failure or have let yourself or your family down? 0 - not at all   Trouble concentrating on things, such as reading the newspaper or watching television? 1 - several days   Moving or speaking so slowly that other people could have noticed.  Or the opposite - being so fidgety or restless that you have been moving around a lot more than usual?  0 - not at all   Thoughts that you would be better off dead, or of hurting yourself?  0 - not at all   Patient Health Questionnaire Score: 7       If depressive symptoms identified deferred to follow up visit unless specifically addressed in assesment and plan.    Interpretation of PHQ-9 Total Score   Score Severity   1-4 No  "Depression   5-9 Mild Depression   10-14 Moderate Depression   15-19 Moderately Severe Depression   20-27 Severe Depression        ROS   Denies any recent fevers or chills. No nausea or vomiting. No chest pains or shortness of breath.     Allergies   Allergen Reactions   • Penicillins      rash       Current medicines (including changes today)  No current outpatient medications on file.     No current facility-administered medications for this visit.       Patient Active Problem List    Diagnosis Date Noted   • Current mild episode of major depressive disorder without prior episode (HCC) 06/02/2021   • Tobacco use 11/03/2020   • Chronic bilateral low back pain with sciatica 11/03/2020   • Chronic pain of both knees 11/03/2020       Family History   Problem Relation Age of Onset   • No Known Problems Mother    • No Known Problems Father        He  has no past medical history on file.  He  has a past surgical history that includes dental extraction(s).       Objective:   Ht 1.753 m (5' 9\") Comment: pt stated  Wt 70.3 kg (155 lb) Comment: pt stated  BMI 22.89 kg/m²     Physical Exam:  Constitutional: Alert, no distress, well-groomed.  Skin: No rashes in visible areas.  Eye: Round. Conjunctiva clear, lids normal. No icterus.   ENMT: Lips pink without lesions, good dentition, moist mucous membranes. Phonation normal.  Respiratory: Unlabored respiratory effort, no cough or audible wheeze  Psych: Alert and oriented x3, normal affect and mood.       Assessment and Plan:   The following treatment plan was discussed:     1. Current mild episode of major depressive disorder without prior episode ([facility])  Az requests referral to Dr. Star Alegria, psychiatrist. Discussed the importance of regular exercise, healthy diet, good sleep hygiene, and positive social interaction. Discussed labs to rule out other etiologies, patient would like to see Dr. Star Alegria.  - REFERRAL TO BEHAVIORAL HEALTH        Follow-up: Return in " about 1 month (around 7/2/2021).

## 2021-06-03 NOTE — ASSESSMENT & PLAN NOTE
Az reports he has been feeling apathetic and depressed for the past couple months. He reports his mother has been struggling with her mental health which has been stressful for the patient. He is also working long hours and has minimal time for himself. He requests referral to Dr. Star Alegria with [facility]. He is not interested in medications.    Depression Screening    Little interest or pleasure in doing things?  2 - more than half the days   Feeling down, depressed , or hopeless? 1 - several days   Trouble falling or staying asleep, or sleeping too much?  1 - several days   Feeling tired or having little energy?  2 - more than half the days   Poor appetite or overeating?  0 - not at all   Feeling bad about yourself - or that you are a failure or have let yourself or your family down? 0 - not at all   Trouble concentrating on things, such as reading the newspaper or watching television? 1 - several days   Moving or speaking so slowly that other people could have noticed.  Or the opposite - being so fidgety or restless that you have been moving around a lot more than usual?  0 - not at all   Thoughts that you would be better off dead, or of hurting yourself?  0 - not at all   Patient Health Questionnaire Score: 7       If depressive symptoms identified deferred to follow up visit unless specifically addressed in assesment and plan.    Interpretation of PHQ-9 Total Score   Score Severity   1-4 No Depression   5-9 Mild Depression   10-14 Moderate Depression   15-19 Moderately Severe Depression   20-27 Severe Depression

## 2021-08-05 ENCOUNTER — HOSPITAL ENCOUNTER (OUTPATIENT)
Facility: MEDICAL CENTER | Age: 26
End: 2021-08-05
Attending: PHYSICIAN ASSISTANT
Payer: OTHER GOVERNMENT

## 2021-08-05 ENCOUNTER — OFFICE VISIT (OUTPATIENT)
Dept: URGENT CARE | Facility: PHYSICIAN GROUP | Age: 26
End: 2021-08-05
Payer: OTHER GOVERNMENT

## 2021-08-05 VITALS
RESPIRATION RATE: 16 BRPM | SYSTOLIC BLOOD PRESSURE: 120 MMHG | WEIGHT: 157.8 LBS | DIASTOLIC BLOOD PRESSURE: 76 MMHG | HEART RATE: 92 BPM | OXYGEN SATURATION: 98 % | HEIGHT: 69 IN | BODY MASS INDEX: 23.37 KG/M2 | TEMPERATURE: 98.4 F

## 2021-08-05 DIAGNOSIS — Z20.822 EXPOSURE TO CONFIRMED CASE OF COVID-19: ICD-10-CM

## 2021-08-05 DIAGNOSIS — R09.81 NASAL CONGESTION: ICD-10-CM

## 2021-08-05 DIAGNOSIS — R05.9 COUGH: ICD-10-CM

## 2021-08-05 PROCEDURE — U0003 INFECTIOUS AGENT DETECTION BY NUCLEIC ACID (DNA OR RNA); SEVERE ACUTE RESPIRATORY SYNDROME CORONAVIRUS 2 (SARS-COV-2) (CORONAVIRUS DISEASE [COVID-19]), AMPLIFIED PROBE TECHNIQUE, MAKING USE OF HIGH THROUGHPUT TECHNOLOGIES AS DESCRIBED BY CMS-2020-01-R: HCPCS

## 2021-08-05 PROCEDURE — 99213 OFFICE O/P EST LOW 20 MIN: CPT | Mod: CS | Performed by: PHYSICIAN ASSISTANT

## 2021-08-05 PROCEDURE — U0005 INFEC AGEN DETEC AMPLI PROBE: HCPCS

## 2021-08-05 ASSESSMENT — ENCOUNTER SYMPTOMS: COUGH: 1

## 2021-08-06 DIAGNOSIS — Z20.822 EXPOSURE TO CONFIRMED CASE OF COVID-19: ICD-10-CM

## 2021-08-06 DIAGNOSIS — R05.9 COUGH: ICD-10-CM

## 2021-08-06 DIAGNOSIS — R09.81 NASAL CONGESTION: ICD-10-CM

## 2021-08-06 LAB — COVID ORDER STATUS COVID19: NORMAL

## 2021-08-06 ASSESSMENT — ENCOUNTER SYMPTOMS: FEVER: 0

## 2021-08-06 NOTE — PROGRESS NOTES
"Subjective:      Az Morris is a 26 y.o. male who presents with Cough (positive exporsure) and Congestion    Medications:    • This patient does not have an active medication from one of the medication groupers.    Allergies: Penicillins    Problem List: Az Morris does not have any pertinent problems on file.    Surgical History:  Past Surgical History:   Procedure Laterality Date   • DENTAL EXTRACTION(S)      wisdom teeth       Past Social Hx: Az Morris  reports that he has quit smoking. His smoking use included cigarettes. His smokeless tobacco use includes chew. He reports current alcohol use. He reports that he does not use drugs.     Past Family Hx:  Az Morris family history includes No Known Problems in his father and mother.     Problem list, medications, and allergies reviewed by myself today in Epic.                Patient presents with:  Cough: positive exporsure  Congestion  Girlfriend tested positive for COVID today.      Cough  This is a new problem. The current episode started in the past 7 days. The problem has been gradually worsening. The problem occurs every few minutes. The cough is non-productive. Associated symptoms include nasal congestion. Pertinent negatives include no fever. Nothing aggravates the symptoms. He has tried nothing for the symptoms. The treatment provided no relief. There is no history of asthma.       Review of Systems   Constitutional: Negative for fever.   HENT: Positive for congestion.    Respiratory: Positive for cough.    All other systems reviewed and are negative.         Objective:     /76   Pulse 92   Temp 36.9 °C (98.4 °F) (Temporal)   Resp 16   Ht 1.753 m (5' 9\")   Wt 71.6 kg (157 lb 12.8 oz)   SpO2 98%   BMI 23.30 kg/m²      Physical Exam  Vitals and nursing note reviewed.   Constitutional:       General: He is not in acute distress.     Appearance: Normal appearance. He is well-developed and normal weight. He is not ill-appearing or " toxic-appearing.   HENT:      Head: Normocephalic and atraumatic.      Right Ear: Tympanic membrane normal.      Left Ear: Tympanic membrane normal.      Nose: Congestion present.      Mouth/Throat:      Lips: Pink.      Mouth: Mucous membranes are moist.      Pharynx: Oropharynx is clear. Uvula midline.   Eyes:      Extraocular Movements: Extraocular movements intact.      Conjunctiva/sclera: Conjunctivae normal.      Pupils: Pupils are equal, round, and reactive to light.   Cardiovascular:      Rate and Rhythm: Normal rate and regular rhythm.      Pulses: Normal pulses.      Heart sounds: Normal heart sounds.   Pulmonary:      Effort: Pulmonary effort is normal.      Breath sounds: Normal breath sounds.   Abdominal:      General: Bowel sounds are normal.      Palpations: Abdomen is soft.   Musculoskeletal:         General: Normal range of motion.      Cervical back: Normal range of motion and neck supple.   Skin:     General: Skin is warm and dry.      Capillary Refill: Capillary refill takes less than 2 seconds.   Neurological:      General: No focal deficit present.      Mental Status: He is alert and oriented to person, place, and time.      Cranial Nerves: No cranial nerve deficit.      Motor: Motor function is intact.      Coordination: Coordination is intact.      Gait: Gait normal.   Psychiatric:         Mood and Affect: Mood normal.                        Assessment/Plan:           1. Exposure to confirmed case of COVID-19  COVID/SARS CoV-2 PCR   2. Nasal congestion  COVID/SARS CoV-2 PCR   3. Cough  COVID/SARS CoV-2 PCR     Per protocol for PUI/ISO patients, the patient was evaluated by me while I was wearing PPE.  Per CDC guidelines, patient has been instructed to self quarantine at home for at least 10 days from onset of symptoms and at least 3 full days after resolution of fever/respiratory symptoms.  PT verbalized agreement to do so.      PT can begin or continue OTC medications, increase fluids and  rest until symptoms improve.     Discussed that I felt this was viral in nature. Did not see any evidence of a bacterial process. Discussed natural progression and sx care.    PT should follow up with PCP in 1-2 days for re-evaluation if symptoms have not improved.      Discussed red flags and reasons to return to UC or ED.      Pt and/or family verbalized understanding of diagnosis and follow up instructions and was offered informational handout on diagnosis.  PT discharged.

## 2021-08-06 NOTE — PATIENT INSTRUCTIONS
INSTRUCTIONS FOR COVID-19 OR ANY OTHER INFECTIOUS RESPIRATORY ILLNESSES    The Centers for Disease Control and Prevention (CDC) states that early indications for COVID-19 include cough, shortness of breath, difficulty breathing, or at least two of the following symptoms: chills, shaking with chills, muscle pain, headache, sore throat, and loss of taste or smell. Symptoms can range from mild to severe and may appear up to two weeks after exposure to the virus.    The practice of self-isolation and quarantine helps protect the public and your family by  preventing exposure to people who have or may have a contagious disease. Please follow the prevention steps below as based on CDC guidelines:    WHEN TO STOP ISOLATION: Persons with COVID-19 or any other infectious respiratory illness who have symptoms and were advised to care for themselves at home may discontinue home isolation under the following conditions:  · At least 24 hours have passed since recovery defined as resolution of fever without the use of fever-reducing medications; AND,  · Improvement in respiratory symptoms (e.g., cough, shortness of breath); AND,  · At least 10 days have passed since symptoms first appeared and have had no subsequent illness.    MONITOR YOUR SYMPTOMS: If your illness is worsening, seek prompt medical attention. If you have a medical emergency and need to call 911, notify the dispatch personnel that you have, or are being evaluated for confirmed or suspected COVID-19 or another infectious respiratory illness. Wear a facemask if possible.    ACTIVITY RESTRICTION: restrict activities outside your home, except for getting medical care. Do not go to work, school, or public areas. Avoid using public transportation, ride-sharing, or taxis.    SCHEDULED MEDICAL APPOINTMENTS: Notify your provider that you have, or are being evaluated for, confirmed or suspected COVID-19 or another infectious respiratory. This will help the healthcare  provider’s office safely take care of you and keep other people from getting exposed or infected.    FACEMASKS, when to wear: Anytime you are away from your home or around other people or pets. If you are unable to wear one, maintain a minimum of 6 feet distancing from others.    LIVING ENVIRONMENT: Stay in a separate room from other people and pets. If possible, use a separate bathroom, have someone else care for your pets and avoid sharing household items. Any items used should be washed thoroughly with soap and water. Clean all “high-touch” surfaces every day. Use a household cleaning spray or wipe, according to the label instructions. High touch surfaces include (but are not limited to) counters, tabletops, doorknobs, bathroom fixtures, toilets, phones, keyboards, tablets, and bedside tables.     HAND WASHING: Frequently wash hands with soap and water for at least 20 seconds,  especially after blowing your nose, coughing, or sneezing; going to the bathroom; before and after interacting with pets; and before and after eating or preparing food. If hands are visibly dirty use soap and water. If soap and water are not available, use an alcohol-based hand  with at least 60% alcohol. Avoid touching your eyes, nose, and mouth with unwashed hands. Cover your coughs and sneezes with a tissue. Throw used tissues in a lined trash can. Immediately wash your hands.    ACTIVE/FACILITATED SELF-MONITORING: Follow instructions provided by your local health department or health professionals, as appropriate. When working with your local health department check their available hours.    [facility]   Phone Number   [place] [phone]   [facility] [place] [phone]   [place] [address]   [place] [phone]     IF YOU HAVE CONFIRMED POSITIVE COVID-19:    Those who have completely recovered from COVID-19 may have immune-boosting antibodies in their plasma--called “convalescent plasma”--that could be  used to treat critically ill COVID19 patients.    [facility] is excited to begin working with [facility] on collecting convalescent plasma from  people who have recovered from COVID-19 as part of a program to treat patients infected with the virus. This FDA-approved “emergency investigational new drug” is a special blood product containing antibodies that may give patients an extra boost to fight the virus.    To be eligible to donate convalescent plasma, you must have a prior COVID-19 diagnosis documented by a laboratory test (or a positive test result for SARS-CoV-2 antibodies) and meet additional eligibility requirements.    If you are interested in donating convalescent plasma or have any additional questions, please contact the [facility] Convalescent Plasma  at [phone] or via e-mail at [email or web address].

## 2021-08-07 LAB
SARS-COV-2 RNA RESP QL NAA+PROBE: DETECTED
SPECIMEN SOURCE: ABNORMAL

## 2021-11-22 ENCOUNTER — OFFICE VISIT (OUTPATIENT)
Dept: MEDICAL GROUP | Facility: MEDICAL CENTER | Age: 26
End: 2021-11-22
Payer: OTHER GOVERNMENT

## 2021-11-22 VITALS
RESPIRATION RATE: 16 BRPM | SYSTOLIC BLOOD PRESSURE: 122 MMHG | HEIGHT: 69 IN | BODY MASS INDEX: 24.23 KG/M2 | HEART RATE: 65 BPM | WEIGHT: 163.6 LBS | TEMPERATURE: 98.2 F | DIASTOLIC BLOOD PRESSURE: 78 MMHG | OXYGEN SATURATION: 100 %

## 2021-11-22 DIAGNOSIS — F32.0 CURRENT MILD EPISODE OF MAJOR DEPRESSIVE DISORDER WITHOUT PRIOR EPISODE (HCC): ICD-10-CM

## 2021-11-22 DIAGNOSIS — R07.81 RIB PAIN: ICD-10-CM

## 2021-11-22 PROCEDURE — 99214 OFFICE O/P EST MOD 30 MIN: CPT | Performed by: FAMILY MEDICINE

## 2021-11-22 NOTE — PROGRESS NOTES
Subjective:     CC: rib pain    HPI:   Az presents today with:     Rib pain  Az reports right rib pain which started about 6 months ago. He reports the pain is intermittent, is worse when he lays on his right side, when lift pushes, pulls, or lifts. 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 He reports the taser training occurred a couple weeks preceding onset of pain. Pain is localized, no radiation. No preceding cough, congestion, fever, chills.    He has taken ibuprofen a couples times and reports it relieves the pain.       Current mild episode of major depressive disorder without prior episode (HCC)  Az reports his symptoms improved for a brief time however he reports recent exacerbation of depressive symptoms due to stress of likely losing his job. He is in [employer] and has decided not to get vaccinated against COVID19 which will results in loss of employment. At our previous appointment he requested referral to Dr. Star Alegria with [facility] - he reports he was unable to get through to scheduling.       History reviewed. No pertinent past medical history.    Social History     Tobacco Use   • Smoking status: Former Smoker     Types: Cigarettes   • Smokeless tobacco: Current User     Types: Chew   Vaping Use   • Vaping Use: Never used   Substance Use Topics   • Alcohol use: Yes     Comment: one to two drinks a week    • Drug use: Never       No current [facility]-ordered outpatient medications on file.     No current [facility]-ordered facility-administered medications on file.       Allergies:  Penicillins    Health Maintenance: Patient declines vaccinations today    ROS:  Gen: no fevers/chills, no changes in weight  Eyes: no changes in vision  ENT: no sore throat, no hearing loss, no bloody nose  Pulm: no SOB  CV: no chest pain    Objective:       Exam:  /78 (BP Location: Left arm, Patient  "Position: Sitting, BP Cuff Size: Adult)   Pulse 65   Temp 36.8 °C (98.2 °F) (Temporal)   Resp 16   Ht 1.753 m (5' 9\")   Wt 74.2 kg (163 lb 9.6 oz)   SpO2 100%   BMI 24.16 kg/m²  Body mass index is 24.16 kg/m².    Gen: Alert and oriented, No apparent distress  Lungs: Normal effort, CTA bilaterally, no wheezes, rhonchi, or rales  CV: Regular rate and rhythm, no murmurs, rubs, or gallops  Right Chest wall: No skin abnormalities, anterior aspect right ribs 6,7,8 mildly tender to palpation      Assessment & Plan:     26 y.o. male with the following -     1. Rib pain  Isolated right anterior rib pain. Patient was hit by taser preceding pain. Recommended 7 day course of scheduled NSAIDS as occasional ibuprofen has relieved pain and symptoms could represent chronic costochondritis.   - [id number] (WITH 1-VIEW CXR) RIGHT; Future    2. Current mild episode of major depressive disorder without prior episode (HCC)  Patient would like to avoid medication if possible. Referral to Dr. Alegria appears to be open, scheduling phone number provided to patient - he will inform me if he is unable to schedule appointment. Discussed the importance of regular exercise, healthy diet, good sleep hygiene, and positive social interaction      Return in about 2 weeks (around 12/6/2021).    Please note this dictation was created using voice recognition software. I have made every reasonable attempt to correct obvious errors, but I expect there may be errors of grammar, and possibly content, that I did not discover before finalizing the note.       "

## 2021-11-22 NOTE — ASSESSMENT & PLAN NOTE
Az reports his symptoms improved for a brief time however he reports recent exacerbation of depressive symptoms due to stress of likely losing his job. He is in the [employer] and has decided not to get vaccinated against COVID19 which will results in loss of employment. At our previous appointment he requested referral to Dr. Star Alegria with [facility] - he reports he was unable to get through to scheduling.

## 2021-11-22 NOTE — ASSESSMENT & PLAN NOTE
Az reports right rib pain which started about 6 months ago. He reports the pain is intermittent, is worse when he lays on his right side, when lift pushes, pulls, or lifts. 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 He reports the taser training occurred a couple weeks preceding onset of pain. Pain is localized, no radiation. No preceding cough, congestion, fever, chills.    He has taken ibuprofen a couples times and reports it relieves the pain.

## 2021-11-23 ENCOUNTER — APPOINTMENT (OUTPATIENT)
Dept: RADIOLOGY | Facility: IMAGING CENTER | Age: 26
End: 2021-11-23
Attending: FAMILY MEDICINE
Payer: OTHER GOVERNMENT

## 2021-11-23 ENCOUNTER — APPOINTMENT (OUTPATIENT)
Dept: URGENT CARE | Facility: PHYSICIAN GROUP | Age: 26
End: 2021-11-23
Payer: OTHER GOVERNMENT

## 2021-11-23 DIAGNOSIS — R07.81 RIB PAIN: ICD-10-CM

## 2021-11-23 PROCEDURE — 71101 X-RAY EXAM UNILAT RIBS/CHEST: CPT | Mod: TC,FY,RT | Performed by: RADIOLOGY

## 2021-12-15 ENCOUNTER — OFFICE VISIT (OUTPATIENT)
Dept: MEDICAL GROUP | Facility: MEDICAL CENTER | Age: 26
End: 2021-12-15
Payer: OTHER GOVERNMENT

## 2021-12-15 VITALS
HEART RATE: 76 BPM | WEIGHT: 159.17 LBS | TEMPERATURE: 98.1 F | OXYGEN SATURATION: 99 % | DIASTOLIC BLOOD PRESSURE: 60 MMHG | RESPIRATION RATE: 16 BRPM | SYSTOLIC BLOOD PRESSURE: 92 MMHG | BODY MASS INDEX: 23.58 KG/M2 | HEIGHT: 69 IN

## 2021-12-15 DIAGNOSIS — F33.42 RECURRENT MAJOR DEPRESSIVE DISORDER, IN FULL REMISSION (HCC): ICD-10-CM

## 2021-12-15 DIAGNOSIS — R07.81 RIB PAIN: ICD-10-CM

## 2021-12-15 DIAGNOSIS — G44.219 EPISODIC TENSION-TYPE HEADACHE, NOT INTRACTABLE: ICD-10-CM

## 2021-12-15 PROBLEM — G44.209 TENSION TYPE HEADACHE: Status: ACTIVE | Noted: 2021-12-15

## 2021-12-15 PROCEDURE — 99214 OFFICE O/P EST MOD 30 MIN: CPT | Performed by: FAMILY MEDICINE

## 2021-12-15 NOTE — PROGRESS NOTES
Subjective:     CC: follow up rib pain, headaches    HPI:   Az presents today with:    Tension type headache  Az reports frequent headaches over the past month. He reports a tension sensation across his forehead and bilateral temples.  He reports headaches can last more than a day.  Relieved with NSAIDs.  He denies pulsating quality, no associated nausea, vomiting, light or sound sensitivity.  No associated aura, vision changes, confusion, numbness, tingling, slurred speech.    He thinks headaches may be stress related - he wishes to avoid COVID19 vaccination which is required by his employer - is is working on switching positions. Sometimes may be related to dehydration.      Rib pain  Az has experienced right rib pain since 1/2021.  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  He reports the pain is intermittent, occurs a few times weekly, is worse when he lays on his right side or puts direct pressure on the area, when he lifts, pushes, pulls.  No other preceding trauma or injury.  Recent x-ray of the area was negative for abnormalities. He reports pain the pain resolves with advil and aleve.     Recurrent major depressive disorder, in full remission (HCC)  Az reports his mood is stable. He tried to make an appointment with Dr. Star Alegria with [facility] but reports he could not get through to schedule an appointment. He feels he is doing well at this time and does not wish to pursue an appointment further.      History reviewed. No pertinent past medical history.    Social History     Tobacco Use   • Smoking status: Former Smoker     Types: Cigarettes   • Smokeless tobacco: Current User     Types: Chew   Vaping Use   • Vaping Use: Never used   Substance Use Topics   • Alcohol use: Yes     Comment: one to two drinks a week    • Drug use: Never       Current Outpatient Medications Ordered in Epic   Medication Sig  "Dispense Refill   • diclofenac sodium (VOLTAREN) 1 % Gel Apply 4 g topically 4 times a day as needed. 150 g 3     No current [facility]-ordered facility-administered medications on file.       Allergies:  Penicillins    Health Maintenance: patient declines vaccinations    ROS:  Gen: no fevers/chills, no changes in weight  Eyes: no changes in vision  ENT: no sore throat, no hearing loss, no bloody nose  Pulm: no SOB  CV: no chest pain    Objective:       Exam:  BP (!) 92/60 (BP Location: Left arm, Patient Position: Sitting, BP Cuff Size: Adult)   Pulse 76   Temp 36.7 °C (98.1 °F) (Temporal)   Resp 16   Ht 1.753 m (5' 9\")   Wt 72.2 kg (159 lb 2.8 oz)   SpO2 99%   BMI 23.51 kg/m²  Body mass index is 23.51 kg/m².    Gen: Alert and oriented, No apparent distress  Lungs: Normal effort, CTA bilaterally, no wheezes, rhonchi, or rales  CV: Regular rate and rhythm, no murmurs, rubs, or gallops  Right chest wall: anterior aspect rib 6-8 tender to palpation    Assessment & Plan:     26 y.o. male with the following -     1. Rib pain  Persistent, intermittent rib pain s/p being hit with taser. As no other preceding injury or trauma seems plausible pain is secondary to taser. Pain is improved with occasional NSAID use. Discussed referral to PT, physiatry, medications for nerve pain. Az prefers to hold on referrals at this time and continue treatment with NSAIDS. Trial of diclofenac gel.  - diclofenac sodium (VOLTAREN) 1 % Gel; Apply 4 g topically 4 times a day as needed.  Dispense: 150 g; Refill: 3    2. Episodic tension-type headache, not intractable  Patient reports HAs likely secondary to work stress. Discussed treatment with acetaminophen up to 1,000mg q 6 hours and/or NSAIDS. Avoid use of medications more than 8-12X/month to avoid medication overuse headaches. No neurological deficits, headaches c/w tension type rather than migraine. Patient will inform me if headaches worsen of if he develops any new symptoms.    3. " Recurrent major depressive disorder, in full remission (HCC)  Stable, CTM. Continue regular exercise, healthy diet, good sleep hygiene, and positive social interaction.        Return in about 6 months (around 6/15/2022).    Please note this dictation was created using voice recognition software. I have made every reasonable attempt to correct obvious errors, but I expect there may be errors of grammar, and possibly content, that I did not discover before finalizing the note.

## 2021-12-15 NOTE — ASSESSMENT & PLAN NOTE
Az reports his mood is stable. He tried to make an appointment with Dr. Star Alegria with [facility] but reports he could not get through to schedule an appointment. He feels he is doing well at this time and does not wish to pursue an appointment further.

## 2021-12-15 NOTE — ASSESSMENT & PLAN NOTE
Az reports frequent headaches over the past month. He reports a tension sensation across his forehead and bilateral temples.  He reports headaches can last more than a day.  Relieved with NSAIDs.  He denies pulsating quality, no associated nausea, vomiting, light or sound sensitivity.  No associated aura, vision changes, confusion, numbness, tingling, slurred speech.    He thinks headaches may be stress related - he wishes to avoid COVID19 vaccination which is required by his employer - is is working on switching positions. Sometimes may be related to dehydration.

## 2021-12-15 NOTE — ASSESSMENT & PLAN NOTE
Az has experienced right rib pain since 1/2021.  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  He reports the pain is intermittent, occurs a few times weekly, is worse when he lays on his right side or puts direct pressure on the area, when he lifts, pushes, pulls.  No other preceding trauma or injury.  Recent x-ray of the area was negative for abnormalities. He reports pain the pain resolves with advil and aleve.

## 2022-04-07 ENCOUNTER — OFFICE VISIT (OUTPATIENT)
Dept: URGENT CARE | Facility: PHYSICIAN GROUP | Age: 27
End: 2022-04-07

## 2022-04-07 ENCOUNTER — APPOINTMENT (OUTPATIENT)
Dept: RADIOLOGY | Facility: IMAGING CENTER | Age: 27
End: 2022-04-07
Attending: STUDENT IN AN ORGANIZED HEALTH CARE EDUCATION/TRAINING PROGRAM
Payer: OTHER GOVERNMENT

## 2022-04-07 VITALS
HEART RATE: 89 BPM | BODY MASS INDEX: 23.25 KG/M2 | WEIGHT: 157 LBS | DIASTOLIC BLOOD PRESSURE: 64 MMHG | RESPIRATION RATE: 16 BRPM | HEIGHT: 69 IN | OXYGEN SATURATION: 100 % | SYSTOLIC BLOOD PRESSURE: 124 MMHG | TEMPERATURE: 98.7 F

## 2022-04-07 DIAGNOSIS — J06.9 VIRAL URI WITH COUGH: ICD-10-CM

## 2022-04-07 LAB
EXTERNAL QUALITY CONTROL: NORMAL
INT CON NEG: NORMAL
INT CON POS: NORMAL
S PYO AG THROAT QL: NEGATIVE
SARS-COV+SARS-COV-2 AG RESP QL IA.RAPID: NEGATIVE

## 2022-04-07 PROCEDURE — 99213 OFFICE O/P EST LOW 20 MIN: CPT | Performed by: STUDENT IN AN ORGANIZED HEALTH CARE EDUCATION/TRAINING PROGRAM

## 2022-04-07 PROCEDURE — 87426 SARSCOV CORONAVIRUS AG IA: CPT | Performed by: STUDENT IN AN ORGANIZED HEALTH CARE EDUCATION/TRAINING PROGRAM

## 2022-04-07 PROCEDURE — 71046 X-RAY EXAM CHEST 2 VIEWS: CPT | Mod: TC,FY | Performed by: RADIOLOGY

## 2022-04-07 PROCEDURE — 87880 STREP A ASSAY W/OPTIC: CPT | Performed by: STUDENT IN AN ORGANIZED HEALTH CARE EDUCATION/TRAINING PROGRAM

## 2022-04-07 NOTE — LETTER
April 7, 2022       Patient: Az Morris   YOB: 1995   Date of Visit: 4/7/2022         To Whom It May Concern:    Az Morris was seen in urgent care on 4/7/2022 for his doctor's appointment.  He is experiencing viral-like symptoms which should be self-limiting.  He tested negative for Covid and rapid strep.  We discussed symptomatic treatment with over-the-counter medications.  Follow-up in urgent care as needed.    If you have any questions or concerns, please don't hesitate to call [phone]          Sincerely,          Sean Whitlock D.O.  Electronically Signed

## 2022-04-07 NOTE — PROGRESS NOTES
Subjective:   CHIEF COMPLAINT  Chief Complaint   Patient presents with   • Cough   • Pharyngitis   • Headache     X 4 days       HPI  Az Morris is a 26 y.o. male who presents with a chief complaint of fever, runny nose, nasal congestion, sore throat, cough and headache.  Symptoms started 4 days ago.  Says he felt warm to touch; did not take his temperature.  No longer feels feverish.  He has tried Robitussin, NyQuil and ibuprofen which provided some relief.  Positive ROS for intermittent wheezing while coughing.  No shortness of breath.  No history of asthma.  He does not smoke tobacco.  He is not vaccinated against Covid.  Says his girlfriend was experiencing similar symptoms.  Patient is in the [employer] and is requesting a note to be excused from drill over the weekend.    REVIEW OF SYSTEMS  General: no fever or chills  GI: no nausea or vomiting  See HPI for further details.     PAST MEDICAL HISTORY  Patient Active Problem List    Diagnosis Date Noted   • Tension type headache 12/15/2021   • Rib pain 11/22/2021   • Recurrent major depressive disorder, in full remission (HCC) 06/02/2021   • Tobacco use 11/03/2020   • Chronic bilateral low back pain with sciatica 11/03/2020   • Chronic pain of both knees 11/03/2020       SURGICAL HISTORY   has a past surgical history that includes dental extraction(s).    ALLERGIES  Allergies   Allergen Reactions   • Penicillins      rash       CURRENT MEDICATIONS  Home Medications     Reviewed by Lonny Chavarria Ass't (Medical Assistant) on 04/07/22 at 1323  Med List Status: <None>   Medication Last Dose Status   diclofenac sodium (VOLTAREN) 1 % Gel Not Taking Flagged for Removal                SOCIAL HISTORY  Social History     Tobacco Use   • Smoking status: Former Smoker     Types: Cigarettes   • Smokeless tobacco: Current User     Types: Chew   Vaping Use   • Vaping Use: Never used   Substance and Sexual Activity   • Alcohol use: Yes     Comment: one to two  "drinks a week    • Drug use: Never   • Sexual activity: Yes     Partners: Female       FAMILY HISTORY  Family History   Problem Relation Age of Onset   • No Known Problems Mother    • No Known Problems Father           Objective:   PHYSICAL EXAM  VITAL SIGNS: /64   Pulse 89   Temp 37.1 °C (98.7 °F) (Temporal)   Resp 16   Ht 1.753 m (5' 9\")   Wt 71.2 kg (157 lb)   SpO2 100%   BMI 23.18 kg/m²     Gen: no acute distress, normal voice  Skin: dry, intact, moist mucosal membranes  ENT: No pharyngeal erythema or exits.  Uvula midline.  Lungs: Rhonchi left lower lobe.  No wheezing or crackles.  Symmetric expansion.  CV: RRR w/o murmurs or clicks   Psych: normal affect, normal judgement, alert, awake      RADIOLOGY RESULTS   DX-CHEST-2 VIEWS    Result Date: 4/7/2022 4/7/2022 2:06 PM HISTORY/REASON FOR EXAM:  Upper respiratory infection and cough. TECHNIQUE/EXAM DESCRIPTION AND NUMBER OF VIEWS: Two views of the chest. COMPARISON:  11/23/2021 FINDINGS: The heart is normal in size. No pulmonary infiltrates or consolidations are noted. No pleural effusions are appreciated.     No active disease.       Rapid strep: Negative  POC Covid: Negative    Assessment/Plan:     1. Viral URI with cough  DX-CHEST-2 VIEWS    POCT SARS-COV Antigen CHILO Manual Result    POCT Rapid Strep A    Signs and symptoms are consistent with a viral upper respiratory infection and should be self limiting.  Patient is not vaccinated against COVID.   -NeilMed sinus rinses  -Flonase, Zyrtec  -Ordered Covid.  Results will be sent through [facility].  -Encouraged symptomatic treatment with Tylenol/ibuprofen prn + hydration  -Provided note stating he was seen in urgent care today with viral-like symptoms.  -Return to urgent care any new/worsening symptoms or further questions or concerns.  Patient understood everything discussed.  All questions were answered.    Differential diagnosis, natural history, supportive care, and indications for immediate " follow-up discussed. Patient agrees with the plan of care.    Follow-up as needed if symptoms worsen or fail to improve to PCP, Urgent care or Emergency Room.       Please note that this dictation was created using voice recognition software. I have made a reasonable attempt to correct obvious errors, but I expect that there are errors of grammar and possibly content that I did not discover before finalizing the note.

## 2022-09-29 ENCOUNTER — APPOINTMENT (OUTPATIENT)
Dept: MEDICAL GROUP | Facility: PHYSICIAN GROUP | Age: 27
End: 2022-09-29
Payer: OTHER GOVERNMENT

## 2023-05-29 ENCOUNTER — OFFICE VISIT (OUTPATIENT)
Dept: URGENT CARE | Facility: PHYSICIAN GROUP | Age: 28
End: 2023-05-29
Payer: OTHER GOVERNMENT

## 2023-05-29 VITALS
HEIGHT: 69 IN | WEIGHT: 160 LBS | SYSTOLIC BLOOD PRESSURE: 110 MMHG | DIASTOLIC BLOOD PRESSURE: 82 MMHG | TEMPERATURE: 97.2 F | HEART RATE: 64 BPM | RESPIRATION RATE: 14 BRPM | BODY MASS INDEX: 23.7 KG/M2 | OXYGEN SATURATION: 98 %

## 2023-05-29 DIAGNOSIS — B00.1 COLD SORE: ICD-10-CM

## 2023-05-29 PROCEDURE — 3074F SYST BP LT 130 MM HG: CPT | Performed by: PHYSICIAN ASSISTANT

## 2023-05-29 PROCEDURE — 3079F DIAST BP 80-89 MM HG: CPT | Performed by: PHYSICIAN ASSISTANT

## 2023-05-29 PROCEDURE — 99213 OFFICE O/P EST LOW 20 MIN: CPT | Performed by: PHYSICIAN ASSISTANT

## 2023-05-29 RX ORDER — VALACYCLOVIR HYDROCHLORIDE 1 G/1
1000 TABLET, FILM COATED ORAL 2 TIMES DAILY
Qty: 28 TABLET | Refills: 0 | Status: SHIPPED | OUTPATIENT
Start: 2023-05-29 | End: 2023-06-12

## 2023-05-29 ASSESSMENT — ENCOUNTER SYMPTOMS
COUGH: 0
SORE THROAT: 0
MYALGIAS: 0
DIARRHEA: 0
CONSTIPATION: 0
CHILLS: 0
NAUSEA: 0
ABDOMINAL PAIN: 0
SHORTNESS OF BREATH: 0
FEVER: 0
VOMITING: 0
HEADACHES: 0
EYE PAIN: 0

## 2023-05-29 NOTE — PROGRESS NOTES
"Subjective:   Az Morris is a 28 y.o. male who presents for Cold Sores      28-year-old male notes development of cold sore right lower lip over the last 2 days.  Requesting antiviral medication.    Review of Systems   Constitutional:  Negative for chills and fever.   HENT:  Negative for congestion, ear pain and sore throat.    Eyes:  Negative for pain.   Respiratory:  Negative for cough and shortness of breath.    Cardiovascular:  Negative for chest pain.   Gastrointestinal:  Negative for abdominal pain, constipation, diarrhea, nausea and vomiting.   Genitourinary:  Negative for dysuria.   Musculoskeletal:  Negative for myalgias.   Skin:  Negative for rash.   Neurological:  Negative for headaches.       Medications, Allergies, and current problem list reviewed today in Epic.     Objective:     /82   Pulse 64   Temp 36.2 °C (97.2 °F) (Temporal)   Resp 14   Ht 1.753 m (5' 9\")   Wt 72.6 kg (160 lb)   SpO2 98%     Physical Exam  Vitals reviewed.   Constitutional:       Appearance: Normal appearance.   HENT:      Head: Normocephalic and atraumatic.      Right Ear: External ear normal.      Left Ear: External ear normal.      Nose: Nose normal.      Mouth/Throat:      Mouth: Mucous membranes are moist.      Comments: Lower lip vermilion border lateral aspect on the right side herpetic lesion with crusting  Eyes:      Conjunctiva/sclera: Conjunctivae normal.   Cardiovascular:      Rate and Rhythm: Normal rate.   Pulmonary:      Effort: Pulmonary effort is normal.   Skin:     General: Skin is warm and dry.      Capillary Refill: Capillary refill takes less than 2 seconds.   Neurological:      Mental Status: He is alert and oriented to person, place, and time.         Assessment/Plan:     Diagnosis and associated orders:     1. Cold sore  valacyclovir (VALTREX) 1 GM Tab         Comments/MDM:     Discussed risk versus benefit and side effects of medication, follow-up as needed.  Recommend 7-day Valtrex twice " daily, patient provided with adequate medication for future flare         Differential diagnosis, natural history, supportive care, and indications for immediate follow-up discussed.    Advised the patient to follow-up with the primary care physician for recheck, reevaluation, and consideration of further management.    Please note that this dictation was created using voice recognition software. I have made a reasonable attempt to correct obvious errors, but I expect that there are errors of grammar and possibly content that I did not discover before finalizing the note.    This note was electronically signed by Jhonny Nelson PA-C

## 2023-07-03 ENCOUNTER — HOSPITAL ENCOUNTER (EMERGENCY)
Facility: MEDICAL CENTER | Age: 28
End: 2023-07-03
Attending: EMERGENCY MEDICINE
Payer: OTHER GOVERNMENT

## 2023-07-03 VITALS
OXYGEN SATURATION: 99 % | BODY MASS INDEX: 23.44 KG/M2 | RESPIRATION RATE: 12 BRPM | TEMPERATURE: 98.6 F | HEIGHT: 69 IN | DIASTOLIC BLOOD PRESSURE: 80 MMHG | WEIGHT: 158.29 LBS | SYSTOLIC BLOOD PRESSURE: 135 MMHG | HEART RATE: 84 BPM

## 2023-07-03 DIAGNOSIS — S05.92XA LEFT EYE INJURY, INITIAL ENCOUNTER: ICD-10-CM

## 2023-07-03 DIAGNOSIS — H21.02 HYPHEMA OF LEFT EYE: ICD-10-CM

## 2023-07-03 DIAGNOSIS — H57.12 LEFT EYE PAIN: ICD-10-CM

## 2023-07-03 LAB
ALBUMIN SERPL BCP-MCNC: 4.9 G/DL (ref 3.2–4.9)
ALBUMIN/GLOB SERPL: 1.9 G/DL
ALP SERPL-CCNC: 87 U/L (ref 30–99)
ALT SERPL-CCNC: 23 U/L (ref 2–50)
ANION GAP SERPL CALC-SCNC: 13 MMOL/L (ref 7–16)
AST SERPL-CCNC: 23 U/L (ref 12–45)
BASOPHILS # BLD AUTO: 0.4 % (ref 0–1.8)
BASOPHILS # BLD: 0.03 K/UL (ref 0–0.12)
BILIRUB SERPL-MCNC: 0.9 MG/DL (ref 0.1–1.5)
BUN SERPL-MCNC: 10 MG/DL (ref 8–22)
CALCIUM ALBUM COR SERPL-MCNC: 9.1 MG/DL (ref 8.5–10.5)
CALCIUM SERPL-MCNC: 9.8 MG/DL (ref 8.4–10.2)
CHLORIDE SERPL-SCNC: 102 MMOL/L (ref 96–112)
CO2 SERPL-SCNC: 25 MMOL/L (ref 20–33)
CREAT SERPL-MCNC: 0.96 MG/DL (ref 0.5–1.4)
EOSINOPHIL # BLD AUTO: 0.08 K/UL (ref 0–0.51)
EOSINOPHIL NFR BLD: 1.2 % (ref 0–6.9)
ERYTHROCYTE [DISTWIDTH] IN BLOOD BY AUTOMATED COUNT: 35.8 FL (ref 35.9–50)
GFR SERPLBLD CREATININE-BSD FMLA CKD-EPI: 110 ML/MIN/1.73 M 2
GLOBULIN SER CALC-MCNC: 2.6 G/DL (ref 1.9–3.5)
GLUCOSE SERPL-MCNC: 102 MG/DL (ref 65–99)
HCT VFR BLD AUTO: 44.8 % (ref 42–52)
HGB BLD-MCNC: 15.1 G/DL (ref 14–18)
IMM GRANULOCYTES # BLD AUTO: 0.02 K/UL (ref 0–0.11)
IMM GRANULOCYTES NFR BLD AUTO: 0.3 % (ref 0–0.9)
LYMPHOCYTES # BLD AUTO: 1.57 K/UL (ref 1–4.8)
LYMPHOCYTES NFR BLD: 23.3 % (ref 22–41)
MCH RBC QN AUTO: 28.5 PG (ref 27–33)
MCHC RBC AUTO-ENTMCNC: 33.7 G/DL (ref 32.3–36.5)
MCV RBC AUTO: 84.7 FL (ref 81.4–97.8)
MONOCYTES # BLD AUTO: 0.43 K/UL (ref 0–0.85)
MONOCYTES NFR BLD AUTO: 6.4 % (ref 0–13.4)
NEUTROPHILS # BLD AUTO: 4.6 K/UL (ref 1.82–7.42)
NEUTROPHILS NFR BLD: 68.4 % (ref 44–72)
NRBC # BLD AUTO: 0 K/UL
NRBC BLD-RTO: 0 /100 WBC (ref 0–0.2)
PLATELET # BLD AUTO: 241 K/UL (ref 164–446)
PMV BLD AUTO: 9.8 FL (ref 9–12.9)
POTASSIUM SERPL-SCNC: 3.4 MMOL/L (ref 3.6–5.5)
PROT SERPL-MCNC: 7.5 G/DL (ref 6–8.2)
RBC # BLD AUTO: 5.29 M/UL (ref 4.7–6.1)
SODIUM SERPL-SCNC: 140 MMOL/L (ref 135–145)
WBC # BLD AUTO: 6.7 K/UL (ref 4.8–10.8)

## 2023-07-03 PROCEDURE — 700101 HCHG RX REV CODE 250: Performed by: EMERGENCY MEDICINE

## 2023-07-03 PROCEDURE — 85025 COMPLETE CBC W/AUTO DIFF WBC: CPT

## 2023-07-03 PROCEDURE — 80053 COMPREHEN METABOLIC PANEL: CPT

## 2023-07-03 PROCEDURE — 99284 EMERGENCY DEPT VISIT MOD MDM: CPT

## 2023-07-03 PROCEDURE — 36415 COLL VENOUS BLD VENIPUNCTURE: CPT

## 2023-07-03 RX ORDER — ATROPINE SULFATE 10 MG/ML
1 SOLUTION/ DROPS OPHTHALMIC 3 TIMES DAILY
Qty: 10 ML | Refills: 0 | Status: SHIPPED | OUTPATIENT
Start: 2023-07-03 | End: 2023-07-03 | Stop reason: SDUPTHER

## 2023-07-03 RX ORDER — LATANOPROST 50 UG/ML
1 SOLUTION/ DROPS OPHTHALMIC NIGHTLY
Qty: 10 ML | Refills: 0 | Status: SHIPPED | OUTPATIENT
Start: 2023-07-03 | End: 2023-07-14

## 2023-07-03 RX ORDER — NEOMYCIN POLYMYXIN B SULFATES AND DEXAMETHASONE 3.5; 10000; 1 MG/ML; [USP'U]/ML; MG/ML
2 SUSPENSION/ DROPS OPHTHALMIC 4 TIMES DAILY
Qty: 10 ML | Refills: 0 | Status: ACTIVE | OUTPATIENT
Start: 2023-07-03 | End: 2023-07-14

## 2023-07-03 RX ORDER — LATANOPROST 50 UG/ML
1 SOLUTION/ DROPS OPHTHALMIC NIGHTLY
Qty: 10 ML | Refills: 0 | Status: SHIPPED | OUTPATIENT
Start: 2023-07-03 | End: 2023-07-03 | Stop reason: SDUPTHER

## 2023-07-03 RX ORDER — NEOMYCIN POLYMYXIN B SULFATES AND DEXAMETHASONE 3.5; 10000; 1 MG/ML; [USP'U]/ML; MG/ML
2 SUSPENSION/ DROPS OPHTHALMIC 4 TIMES DAILY
Qty: 10 ML | Refills: 0 | Status: ACTIVE | OUTPATIENT
Start: 2023-07-03 | End: 2023-07-03 | Stop reason: SDUPTHER

## 2023-07-03 RX ORDER — ATROPINE SULFATE 10 MG/ML
1 SOLUTION/ DROPS OPHTHALMIC 3 TIMES DAILY
Qty: 10 ML | Refills: 0 | Status: SHIPPED | OUTPATIENT
Start: 2023-07-03 | End: 2023-08-23

## 2023-07-03 RX ADMIN — FLUORESCEIN SODIUM 1 MG: 1 STRIP OPHTHALMIC at 16:25

## 2023-07-03 NOTE — ED TRIAGE NOTES
"Chief Complaint   Patient presents with    Eye Injury     Hit in Left Eye by piece of metal, states \"white blur\" out of Left Eye    Facial Injury     Abrasion Left side of nsoe     BP (!) 148/91   Pulse 85   Temp 36.8 °C (98.2 °F) (Temporal)   Resp 14   Ht 1.753 m (5' 9\")   Wt 71.8 kg (158 lb 4.6 oz)   SpO2 100%   BMI 23.38 kg/m²     Pt ambulated to ED by self, states was working w/ metal, flew up and hit pt in Left eye.  Pt states vision in Left Eye is a \"white blur.\"    "

## 2023-07-03 NOTE — ED PROVIDER NOTES
"ER Provider Note    Scribed for Joseluis Hurtado D.O. by Aliyah Rosen. 7/3/2023  3:41 PM    Primary Care Provider: Nelli Alvarado M.D.    CHIEF COMPLAINT  Chief Complaint   Patient presents with    Eye Injury     Hit in Left Eye by piece of metal, states \"white blur\" out of Left Eye    Facial Injury     Abrasion Left side of nsoe     HPI/ROS  LIMITATION TO HISTORY   None  OUTSIDE HISTORIAN(S):  None present    Az Morris is a 28 y.o. male who presents to the Emergency Department for evaluation following a left eye injury onset 45 minutes ago. Patient states he was working on a shop press, was trying to push a bearing out when pressure built up and \"everything exploded\". He reports that the shop press was at eye level. Patient states that he is unsure what hit his eye but describes that he was using 2 metal bars to support what he was working on and believes that one snapped and hit him. He adds that something also hit him on the left jaw but denies being hit in the nose. Currently he rates the pain a 3-4/10 but is unable to see anything out of his left eye. He describes it as a sheet that is right in front of his eye. No alleviating or exacerbating factors noted.    ROS as per HPI.    PAST MEDICAL HISTORY  Past Medical History:   Diagnosis Date    Patient denies medical problems      SURGICAL HISTORY  Past Surgical History:   Procedure Laterality Date    DENTAL EXTRACTION(S)      wisdom teeth     FAMILY HISTORY  Family History   Problem Relation Age of Onset    No Known Problems Mother     No Known Problems Father      SOCIAL HISTORY   reports that he has quit smoking. His smoking use included cigarettes. His smokeless tobacco use includes chew. He reports current alcohol use. He reports that he does not use drugs.    CURRENT MEDICATIONS  Discharge Medication List as of 7/3/2023  5:17 PM        CONTINUE these medications which have NOT CHANGED    Details   diclofenac sodium (VOLTAREN) 1 % Gel Apply 4 g " "topically 4 times a day as needed., Disp-150 g, R-3, Normal           ALLERGIES  Penicillins    PHYSICAL EXAM  BP (!) 148/91   Pulse 85   Temp 36.8 °C (98.2 °F) (Temporal)   Resp 14   Ht 1.753 m (5' 9\")   Wt 71.8 kg (158 lb 4.6 oz)   SpO2 100%   BMI 23.38 kg/m²     General: No acute distress.  HENT: Normocephalic, Mucus membranes are moist. Several eye spots. Left eye interior chamber is showing hyphema. Unable to count fingers with left eye. Abrasion to the left nare with no swelling or bony tenderness.  Neuro: Awake, Conversive, Able to relay recent events.  Psychiatric: Calm and cooperative.       INITIAL ASSESSMENT  Patient has hyphema to the left eye with visual acuity and is unable to count fingers. Will discuss with the ophthalmologist on call.    ED Observation Status? Yes; I am placing the patient in to an observation status due to a diagnostic uncertainty as well as therapeutic intensity. Patient placed in observation status at 3:52 PM, 7/3/2023.     Observation plan is as follows: Monitor for symptom management and diagnostic results.    Upon Reevaluation, the patient's condition has: Improved; and will be discharged.    Patient discharged from ED Observation status at 5:17 PM (Time) 7/3/2023 (Date).     DIAGNOSTIC STUDIES    Labs:   Results for orders placed or performed during the hospital encounter of 07/03/23   CBC WITH DIFFERENTIAL   Result Value Ref Range    WBC 6.7 4.8 - 10.8 K/uL    RBC 5.29 4.70 - 6.10 M/uL    Hemoglobin 15.1 14.0 - 18.0 g/dL    Hematocrit 44.8 42.0 - 52.0 %    MCV 84.7 81.4 - 97.8 fL    MCH 28.5 27.0 - 33.0 pg    MCHC 33.7 32.3 - 36.5 g/dL    RDW 35.8 (L) 35.9 - 50.0 fL    Platelet Count 241 164 - 446 K/uL    MPV 9.8 9.0 - 12.9 fL    Neutrophils-Polys 68.40 44.00 - 72.00 %    Lymphocytes 23.30 22.00 - 41.00 %    Monocytes 6.40 0.00 - 13.40 %    Eosinophils 1.20 0.00 - 6.90 %    Basophils 0.40 0.00 - 1.80 %    Immature Granulocytes 0.30 0.00 - 0.90 %    Nucleated RBC 0.00 " 0.00 - 0.20 /100 WBC    Neutrophils (Absolute) 4.60 1.82 - 7.42 K/uL    Lymphs (Absolute) 1.57 1.00 - 4.80 K/uL    Monos (Absolute) 0.43 0.00 - 0.85 K/uL    Eos (Absolute) 0.08 0.00 - 0.51 K/uL    Baso (Absolute) 0.03 0.00 - 0.12 K/uL    Immature Granulocytes (abs) 0.02 0.00 - 0.11 K/uL    NRBC (Absolute) 0.00 K/uL   COMP METABOLIC PANEL   Result Value Ref Range    Sodium 140 135 - 145 mmol/L    Potassium 3.4 (L) 3.6 - 5.5 mmol/L    Chloride 102 96 - 112 mmol/L    Co2 25 20 - 33 mmol/L    Anion Gap 13.0 7.0 - 16.0    Glucose 102 (H) 65 - 99 mg/dL    Bun 10 8 - 22 mg/dL    Creatinine 0.96 0.50 - 1.40 mg/dL    Calcium 9.8 8.4 - 10.2 mg/dL    AST(SGOT) 23 12 - 45 U/L    ALT(SGPT) 23 2 - 50 U/L    Alkaline Phosphatase 87 30 - 99 U/L    Total Bilirubin 0.9 0.1 - 1.5 mg/dL    Albumin 4.9 3.2 - 4.9 g/dL    Total Protein 7.5 6.0 - 8.2 g/dL    Globulin 2.6 1.9 - 3.5 g/dL    A-G Ratio 1.9 g/dL   CORRECTED CALCIUM   Result Value Ref Range    Correct Calcium 9.1 8.5 - 10.5 mg/dL   ESTIMATED GFR   Result Value Ref Range    GFR (CKD-EPI) 110 >60 mL/min/1.73 m 2     COURSE & MEDICAL DECISION MAKING     COURSE AND PLAN  3:41 PM - Patient seen and examined at bedside. Discussed plan of care, including ordering lab work for further evaluation. Patient agrees to the plan of care. Ordered for CBC w/ diff, CMP, GFR and corrected calcium to evaluate his symptoms. Paged opthalmology. I discussed the patient's case and the above findings with Dr. Tamayo (ophthalmology) who agreed to consult.    5:18 PM - Patient was reevaluated at bedside. The patient was seen in the ED by Dr. Tamayo (Ophthalmology). I spoke with Dr. Tamayo and the patient will be discharged at this time. I discussed plan for discharge and follow up as outlined below. The patient will be off work for 3 days and will follow up with Dr. Tamayo on Wednesday. Prescriptions are written. The patient is stable for discharge at this time and will return for any new or worsening symptoms.  Patient verbalizes understanding and support with my plan for discharge.      ED Summary: Patient has a trauma to the left eye.  There is a hyphema.  I spoke with Dr. Tamayo she came in to evaluate the patient.  Please see her note.  The patient is discharged home with medications as described/instructed by Dr. Tamayo and he is to follow-up with Dr. Knowles in the office on Wednesday.  He is given C4 form with no work until then.      DISPOSITION AND DISCUSSIONS  I have discussed management of the patient with the following physicians and JOVON's:  Dr. Tamayo (Ophthalmology)    The patient will return for new or worsening symptoms and is stable at the time of discharge.    The patient is referred to a primary physician for blood pressure management, diabetic screening, and for all other preventative health concerns.    DISPOSITION:  Patient will be discharged home in stable condition.    FOLLOW UP:  Kathy Tamayo M.D.  [address] [place] [id number]  [phone]    In 3 days      OUTPATIENT MEDICATIONS:  Discharge Medication List as of 7/3/2023  5:17 PM        START taking these medications    Details   neomycin-polymyxin-dexamethasone (MAXITROL) 0.1 % ophthalmic suspension Administer 2 Drops into the left eye 4 times a day., Disp-10 mL, R-0, Normal      atropine 1 % Solution Administer 1 Drop into the left eye 3 times a day., Disp-10 mL, R-0, Normal      latanoprost (XALATAN) 0.005 % Solution Administer 1 Drop into both eyes every evening., Disp-10 mL, R-0, Normal           FINAL DIAGNOSIS  1. Left eye pain    2. Left eye injury, initial encounter    3. Hyphema of left eye      Aliyah HOUSE (Ashley), am scribing for, and in the presence of, Joseluis Hurtado D.O..    Electronically signed by: Aliyah Rosen (Ashley), 7/3/2023    Joseluis HOUSE D.O. personally performed the services described in this documentation, as scribed by Aliyah Rosen in my presence, and it is both accurate and complete.    The note accurately  reflects work and decisions made by me.  Joseluis Hurtado D.O.  7/3/2023  6:52 PM

## 2023-07-04 NOTE — CONSULTS
DATE OF SERVICE:  07/03/2023     EMERGENCY ROOM CONSULTATION REPORT     HISTORY OF PRESENT ILLNESS:  The patient is a 28-year-old gentleman who   sustained a blunt injury to the left eye at work.  He stated that he can see   motion in the left eye, but not count fingers.  There is no significant ocular   history.     PHYSICAL EXAMINATION:  Slit lamp examination showed a hyphema, which had not   layered.  Intraocular pressures were approximately 12 in the right eye and 22   in the left eye.  A B-scan ultrasound of the left eye showed no retinal   detachments.     IMPRESSION:  Traumatic hyphema, left eye.     RECOMMENDATIONS:  Maxitrol one drop to left eye 4 times a day, Xalatan one   drop to the left eye once at night, atropine 1% one drop to the left eye once   a day, can substitute Cyclogyl 1% one drop to the left eye 3 times a day.  The   patient is to follow up in our office within a couple of days.  He will give   our office a call.        ______________________________  KJ TILLMAN MD    [facility]    DD:  07/03/2023 22:00  DT:  07/03/2023 23:32    Job#:  [id number]

## 2023-07-04 NOTE — DISCHARGE INSTRUCTIONS
Take the eyedrops as scheduled.  You are off work until Wednesday to follow-up with Dr. Tamayo on Wednesday.  Please call the number provided above to make an appointment at our office.

## 2023-07-13 SDOH — ECONOMIC STABILITY: TRANSPORTATION INSECURITY
IN THE PAST 12 MONTHS, HAS THE LACK OF TRANSPORTATION KEPT YOU FROM MEDICAL APPOINTMENTS OR FROM GETTING MEDICATIONS?: NO

## 2023-07-13 SDOH — HEALTH STABILITY: MENTAL HEALTH
STRESS IS WHEN SOMEONE FEELS TENSE, NERVOUS, ANXIOUS, OR CAN'T SLEEP AT NIGHT BECAUSE THEIR MIND IS TROUBLED. HOW STRESSED ARE YOU?: VERY MUCH

## 2023-07-13 SDOH — HEALTH STABILITY: PHYSICAL HEALTH: ON AVERAGE, HOW MANY MINUTES DO YOU ENGAGE IN EXERCISE AT THIS LEVEL?: 60 MIN

## 2023-07-13 SDOH — ECONOMIC STABILITY: HOUSING INSECURITY
IN THE LAST 12 MONTHS, WAS THERE A TIME WHEN YOU DID NOT HAVE A STEADY PLACE TO SLEEP OR SLEPT IN A SHELTER (INCLUDING NOW)?: NO

## 2023-07-13 SDOH — ECONOMIC STABILITY: HOUSING INSECURITY: IN THE LAST 12 MONTHS, HOW MANY PLACES HAVE YOU LIVED?: 1

## 2023-07-13 SDOH — ECONOMIC STABILITY: TRANSPORTATION INSECURITY
IN THE PAST 12 MONTHS, HAS LACK OF TRANSPORTATION KEPT YOU FROM MEETINGS, WORK, OR FROM GETTING THINGS NEEDED FOR DAILY LIVING?: NO

## 2023-07-13 SDOH — ECONOMIC STABILITY: FOOD INSECURITY: WITHIN THE PAST 12 MONTHS, YOU WORRIED THAT YOUR FOOD WOULD RUN OUT BEFORE YOU GOT MONEY TO BUY MORE.: NEVER TRUE

## 2023-07-13 SDOH — ECONOMIC STABILITY: FOOD INSECURITY: WITHIN THE PAST 12 MONTHS, THE FOOD YOU BOUGHT JUST DIDN'T LAST AND YOU DIDN'T HAVE MONEY TO GET MORE.: NEVER TRUE

## 2023-07-13 SDOH — ECONOMIC STABILITY: INCOME INSECURITY: IN THE LAST 12 MONTHS, WAS THERE A TIME WHEN YOU WERE NOT ABLE TO PAY THE MORTGAGE OR RENT ON TIME?: NO

## 2023-07-13 SDOH — ECONOMIC STABILITY: TRANSPORTATION INSECURITY
IN THE PAST 12 MONTHS, HAS LACK OF RELIABLE TRANSPORTATION KEPT YOU FROM MEDICAL APPOINTMENTS, MEETINGS, WORK OR FROM GETTING THINGS NEEDED FOR DAILY LIVING?: NO

## 2023-07-13 SDOH — HEALTH STABILITY: PHYSICAL HEALTH: ON AVERAGE, HOW MANY DAYS PER WEEK DO YOU ENGAGE IN MODERATE TO STRENUOUS EXERCISE (LIKE A BRISK WALK)?: 5 DAYS

## 2023-07-13 SDOH — ECONOMIC STABILITY: INCOME INSECURITY: HOW HARD IS IT FOR YOU TO PAY FOR THE VERY BASICS LIKE FOOD, HOUSING, MEDICAL CARE, AND HEATING?: SOMEWHAT HARD

## 2023-07-13 ASSESSMENT — SOCIAL DETERMINANTS OF HEALTH (SDOH)
IN A TYPICAL WEEK, HOW MANY TIMES DO YOU TALK ON THE PHONE WITH FAMILY, FRIENDS, OR NEIGHBORS?: MORE THAN THREE TIMES A WEEK
HOW MANY DRINKS CONTAINING ALCOHOL DO YOU HAVE ON A TYPICAL DAY WHEN YOU ARE DRINKING: 1 OR 2
HOW OFTEN DO YOU ATTENT MEETINGS OF THE CLUB OR ORGANIZATION YOU BELONG TO?: PATIENT DECLINED
HOW OFTEN DO YOU HAVE A DRINK CONTAINING ALCOHOL: 2-3 TIMES A WEEK
HOW OFTEN DO YOU GET TOGETHER WITH FRIENDS OR RELATIVES?: ONCE A WEEK
HOW OFTEN DO YOU ATTENT MEETINGS OF THE CLUB OR ORGANIZATION YOU BELONG TO?: PATIENT DECLINED
HOW OFTEN DO YOU ATTEND CHURCH OR RELIGIOUS SERVICES?: NEVER
DO YOU BELONG TO ANY CLUBS OR ORGANIZATIONS SUCH AS CHURCH GROUPS UNIONS, FRATERNAL OR ATHLETIC GROUPS, OR SCHOOL GROUPS?: NO
IN A TYPICAL WEEK, HOW MANY TIMES DO YOU TALK ON THE PHONE WITH FAMILY, FRIENDS, OR NEIGHBORS?: MORE THAN THREE TIMES A WEEK
HOW OFTEN DO YOU ATTEND CHURCH OR RELIGIOUS SERVICES?: NEVER
HOW OFTEN DO YOU HAVE SIX OR MORE DRINKS ON ONE OCCASION: LESS THAN MONTHLY
HOW OFTEN DO YOU GET TOGETHER WITH FRIENDS OR RELATIVES?: ONCE A WEEK
WITHIN THE PAST 12 MONTHS, YOU WORRIED THAT YOUR FOOD WOULD RUN OUT BEFORE YOU GOT THE MONEY TO BUY MORE: NEVER TRUE
DO YOU BELONG TO ANY CLUBS OR ORGANIZATIONS SUCH AS CHURCH GROUPS UNIONS, FRATERNAL OR ATHLETIC GROUPS, OR SCHOOL GROUPS?: NO
HOW HARD IS IT FOR YOU TO PAY FOR THE VERY BASICS LIKE FOOD, HOUSING, MEDICAL CARE, AND HEATING?: SOMEWHAT HARD

## 2023-07-13 ASSESSMENT — LIFESTYLE VARIABLES
HOW OFTEN DO YOU HAVE SIX OR MORE DRINKS ON ONE OCCASION: LESS THAN MONTHLY
HOW OFTEN DO YOU HAVE A DRINK CONTAINING ALCOHOL: 2-3 TIMES A WEEK
AUDIT-C TOTAL SCORE: 4
HOW MANY STANDARD DRINKS CONTAINING ALCOHOL DO YOU HAVE ON A TYPICAL DAY: 1 OR 2
SKIP TO QUESTIONS 9-10: 0

## 2023-07-14 ENCOUNTER — OFFICE VISIT (OUTPATIENT)
Dept: MEDICAL GROUP | Facility: MEDICAL CENTER | Age: 28
End: 2023-07-14
Payer: OTHER GOVERNMENT

## 2023-07-14 VITALS
SYSTOLIC BLOOD PRESSURE: 98 MMHG | OXYGEN SATURATION: 99 % | TEMPERATURE: 97.9 F | WEIGHT: 150.91 LBS | HEART RATE: 55 BPM | HEIGHT: 69 IN | BODY MASS INDEX: 22.35 KG/M2 | RESPIRATION RATE: 18 BRPM | DIASTOLIC BLOOD PRESSURE: 64 MMHG

## 2023-07-14 DIAGNOSIS — M54.6 CHRONIC BILATERAL THORACIC BACK PAIN: ICD-10-CM

## 2023-07-14 DIAGNOSIS — Z72.0 TOBACCO USE: ICD-10-CM

## 2023-07-14 DIAGNOSIS — B00.1 RECURRENT COLD SORES: ICD-10-CM

## 2023-07-14 DIAGNOSIS — S05.92XS LEFT EYE INJURY, SEQUELA: ICD-10-CM

## 2023-07-14 DIAGNOSIS — F33.42 RECURRENT MAJOR DEPRESSIVE DISORDER, IN FULL REMISSION (HCC): ICD-10-CM

## 2023-07-14 DIAGNOSIS — G89.29 CHRONIC BILATERAL THORACIC BACK PAIN: ICD-10-CM

## 2023-07-14 PROBLEM — S05.92XA LEFT EYE INJURY: Status: ACTIVE | Noted: 2023-07-14

## 2023-07-14 PROBLEM — R07.81 RIB PAIN: Status: RESOLVED | Noted: 2021-11-22 | Resolved: 2023-07-14

## 2023-07-14 PROCEDURE — 3078F DIAST BP <80 MM HG: CPT | Performed by: STUDENT IN AN ORGANIZED HEALTH CARE EDUCATION/TRAINING PROGRAM

## 2023-07-14 PROCEDURE — 99214 OFFICE O/P EST MOD 30 MIN: CPT | Performed by: STUDENT IN AN ORGANIZED HEALTH CARE EDUCATION/TRAINING PROGRAM

## 2023-07-14 PROCEDURE — 3074F SYST BP LT 130 MM HG: CPT | Performed by: STUDENT IN AN ORGANIZED HEALTH CARE EDUCATION/TRAINING PROGRAM

## 2023-07-14 RX ORDER — VALACYCLOVIR HYDROCHLORIDE 1 G/1
TABLET, FILM COATED ORAL
Qty: 12 TABLET | Refills: 1 | Status: SHIPPED | OUTPATIENT
Start: 2023-07-14

## 2023-07-14 ASSESSMENT — PATIENT HEALTH QUESTIONNAIRE - PHQ9
5. POOR APPETITE OR OVEREATING: NOT AT ALL
3. TROUBLE FALLING OR STAYING ASLEEP OR SLEEPING TOO MUCH: NOT AT ALL
SUM OF ALL RESPONSES TO PHQ9 QUESTIONS 1 AND 2: 0
SUM OF ALL RESPONSES TO PHQ QUESTIONS 1-9: 0
7. TROUBLE CONCENTRATING ON THINGS, SUCH AS READING THE NEWSPAPER OR WATCHING TELEVISION: NOT AT ALL
CLINICAL INTERPRETATION OF PHQ2 SCORE: 1
1. LITTLE INTEREST OR PLEASURE IN DOING THINGS: NOT AT ALL
5. POOR APPETITE OR OVEREATING: 0 - NOT AT ALL
2. FEELING DOWN, DEPRESSED, IRRITABLE, OR HOPELESS: NOT AT ALL
SUM OF ALL RESPONSES TO PHQ QUESTIONS 1-9: 6
8. MOVING OR SPEAKING SO SLOWLY THAT OTHER PEOPLE COULD HAVE NOTICED. OR THE OPPOSITE, BEING SO FIGETY OR RESTLESS THAT YOU HAVE BEEN MOVING AROUND A LOT MORE THAN USUAL: NOT AT ALL
6. FEELING BAD ABOUT YOURSELF - OR THAT YOU ARE A FAILURE OR HAVE LET YOURSELF OR YOUR FAMILY DOWN: NOT AL ALL
9. THOUGHTS THAT YOU WOULD BE BETTER OFF DEAD, OR OF HURTING YOURSELF: NOT AT ALL
4. FEELING TIRED OR HAVING LITTLE ENERGY: NOT AT ALL

## 2023-07-14 ASSESSMENT — ENCOUNTER SYMPTOMS
SHORTNESS OF BREATH: 0
FEVER: 0
BLURRED VISION: 1
NERVOUS/ANXIOUS: 0
EYE PAIN: 0
VOMITING: 0
CHILLS: 0
DEPRESSION: 0
HEADACHES: 1
DIARRHEA: 0
NAUSEA: 0
DIZZINESS: 0
ABDOMINAL PAIN: 0

## 2023-07-14 ASSESSMENT — FIBROSIS 4 INDEX: FIB4 SCORE: 0.56

## 2023-07-14 NOTE — PROGRESS NOTES
Subjective:     CC:    Chief Complaint   Patient presents with    Establish Care    Referral Needed     Dr. Kathy Tamayo [facility] follow up (L) eye injury         HISTORY OF THE PRESENT ILLNESS: Patient is a 28 y.o. male. This pleasant patient is here today to establish care and discuss referral to ophthalmology. 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    Problem   Left Eye Injury    This is an acute problem.  Patient suffered a injury to his left eye on 7/3/2023 resulting in hyphema.  He was evaluated in the ER that day.  He did not require surgery.  Has been following up with ophthalmology outpatient.  Initially he could only see motion out of his eye.  His vision has since improved but is still blurry.  He continues to use atropine eyedrops per ophthalmology, Dr. Kathy Tamayo.  He needs a referral to continue to see ophthalmology.     Recurrent Cold Sores    Chronic recurrent problem.  Patient has had symptoms twice this year.  He is requesting a prescription for Valtrex so that he does not need to be seen in urgent care when he has cold sores.     Recurrent Major Depressive Disorder, in Full Remission (Hcc)    Chronic condition, diagnosed in 2022. Has not needed medication treatment for this. He did see a therapist once but does not find therapy helpful. Symptoms have been improving, especially since the birth of his son ~5 months ago.     Depression Screening 07/14/2022  Little interest or pleasure in doing things?  0 - not at all  Feeling down, depressed , or hopeless? 1 - several days  Trouble falling or staying asleep, or sleeping too much?  3 - nearly every day  Feeling tired or having little energy?  1 - several days  Poor appetite or overeating?  0 - not at all  Feeling bad about yourself - or that you are a failure or have let yourself or your family down? 0 - not at all  Trouble concentrating on things, such as reading the  newspaper or watching television? 1 - several days  Moving or speaking so slowly that other people could have noticed.  Or the opposite - being so fidgety or restless that you have been moving around a lot more than usual?  0 - not at all  Thoughts that you would be better off dead, or of hurting yourself?  0 - not at all  Patient Health Questionnaire Score: 6     Tobacco Use    Former cigarette smoker. Quit at least 6 years ago. Still chews tobacco but has been trying to cut back. Has been able to cut his use in half in recent weeks.     Thoracic Back Pain    Chronic recurrent problem with occasional flare ups. He was evaluated for this previously and tried PT for 6 months without improvement. Now, symptoms do not bother him most of the time and he takes ibuprofen as needed for exacerbations.       Rib Pain (Resolved)         Past Medical History: Diagnoses of Left eye injury, sequela, Chronic bilateral thoracic back pain, Recurrent major depressive disorder, in full remission (HCC), Tobacco use, and Recurrent cold sores were pertinent to this visit.    Current Outpatient Medications   Medication Sig    valacyclovir (VALTREX) 1 GM Tab Take 2 tablets twice a day for 1 day when you have a cold sore. Start as soon as possible after onset.    atropine 1 % Solution Administer 1 Drop into the left eye 3 times a day.        Social History     Socioeconomic History    Marital status: Single    Highest education level: Bachelor's degree (e.g., BA, AB, BS)   Tobacco Use    Smoking status: Former     Packs/day: 0.50     Years: 2.00     Pack years: 1.00     Types: Cigarettes     Quit date: 2017     Years since quittin.5    Smokeless tobacco: Current     Types: Chew    Tobacco comments:     Has been cutting back significantly.   Vaping Use    Vaping Use: Never used   Substance and Sexual Activity    Alcohol use: Yes     Alcohol/week: 2.4 oz     Types: 4 Cans of beer per week     Comment: Occ    Drug use: Never    Sexual  activity: Yes     Partners: Female     Birth control/protection: Pill     Social Determinants of Health     Financial Resource Strain: Medium Risk (7/13/2023)    Overall Financial Resource Strain (CARDIA)     Difficulty of Paying Living Expenses: Somewhat hard   Food Insecurity: No Food Insecurity (7/13/2023)    Hunger Vital Sign     Worried About Running Out of Food in the Last Year: Never true     Ran Out of Food in the Last Year: Never true   Transportation Needs: No Transportation Needs (7/13/2023)    PRAPARE - Transportation     Lack of Transportation (Medical): No     Lack of Transportation (Non-Medical): No   Physical Activity: Sufficiently Active (7/13/2023)    Exercise Vital Sign     Days of Exercise per Week: 5 days     Minutes of Exercise per Session: 60 min   Stress: Stress Concern Present (7/13/2023)    [employer] [place] [employer] - Occupational Stress Questionnaire     Feeling of Stress : Very much   Social Connections: Moderately Isolated (7/13/2023)    Social Connection and Isolation Panel [NHANES]     Frequency of Communication with Friends and Family: More than three times a week     Frequency of Social Gatherings with Friends and Family: Once a week     Attends Tenriism Services: Never     Active Member of Clubs or Organizations: No     Attends Club or Organization Meetings: Patient refused     Marital Status:    Housing Stability: Low Risk  (7/13/2023)    Housing Stability Vital Sign     Unable to Pay for Housing in the Last Year: No     Number of Places Lived in the Last Year: 1     Unstable Housing in the Last Year: No       Family History   Problem Relation Age of Onset    Alcohol abuse Mother         Alcoholic, sober for 2 years    No Known Problems Father     No Known Problems Sister     Heart Disease Paternal Uncle         Heat issues, has pace maker    Stroke Paternal Uncle     Stroke Maternal Grandmother         Stroke in old age    Stroke Paternal Grandfather      "Cancer Neg Hx     Ovarian Cancer Neg Hx     Tubal Cancer Neg Hx     Peritoneal Cancer Neg Hx     Colorectal Cancer Neg Hx     Breast Cancer Neg Hx          Health Maintenance: Completed      ROS:   Review of Systems   Constitutional:  Negative for chills and fever.   Eyes:  Positive for blurred vision. Negative for pain.   Respiratory:  Negative for shortness of breath.    Cardiovascular:  Negative for chest pain.   Gastrointestinal:  Negative for abdominal pain, diarrhea, nausea and vomiting.   Genitourinary:  Negative for dysuria.   Neurological:  Positive for headaches. Negative for dizziness.   Psychiatric/Behavioral:  Negative for depression and suicidal ideas. The patient is not nervous/anxious.        Objective:       Exam: BP 98/64 (BP Location: Left arm, Patient Position: Sitting, BP Cuff Size: Adult long)   Pulse (!) 55   Temp 36.6 °C (97.9 °F) (Temporal)   Resp 18   Ht 1.753 m (5' 9\")   Wt 68.5 kg (150 lb 14.5 oz)   SpO2 99%  Body mass index is 22.28 kg/m².    Physical Exam  Constitutional:       Appearance: He is not toxic-appearing.   HENT:      Mouth/Throat:      Mouth: Mucous membranes are moist.   Eyes:      Extraocular Movements: Extraocular movements intact.      Conjunctiva/sclera: Conjunctivae normal.      Pupils: Pupils are equal, round, and reactive to light.   Cardiovascular:      Rate and Rhythm: Normal rate.      Heart sounds: Normal heart sounds.   Pulmonary:      Effort: Pulmonary effort is normal.      Breath sounds: Normal breath sounds.   Musculoskeletal:         General: No swelling.      Cervical back: Neck supple.   Lymphadenopathy:      Cervical: No cervical adenopathy.   Skin:     General: Skin is warm and dry.   Neurological:      General: No focal deficit present.      Mental Status: He is alert and oriented to person, place, and time.   Psychiatric:         Mood and Affect: Mood normal.           Assessment & Plan:     28 y.o. male with the following -    1. Left eye " injury, sequela  Acute traumatic injury to the left eye.  Patient continues to have blurred vision, but this is improving.  He has been seeing Dr. Tamayo ophthalmologist, needs referral to keep seeing her.  - Referral to Ophthalmology    2. Chronic bilateral thoracic back pain  Chronic, intermittent.  Continue regular stretching.  Activity modification and NSAIDs recommended as needed when symptoms flare up.    3. Recurrent major depressive disorder, in full remission (HCC)  Chronic, controlled.  Depressive symptoms have improved significantly in recent months.    4. Tobacco use  Patient chews tobacco.  He has been trying to cut back on use.  Counseled on cessation.    5. Recurrent cold sores  Chronic recurrent problem.  Patient does not have any active lesions.  I will prescribe Valtrex for patient to have on hand and take when he has active symptoms.  - valacyclovir (VALTREX) 1 GM Tab; Take 2 tablets twice a day for 1 day when you have a cold sore. Start as soon as possible after onset.  Dispense: 12 Tablet; Refill: 1        Return in about 1 year (around 7/14/2024) for annual.    Please note that this dictation was created using voice recognition software. I have made every reasonable attempt to correct obvious errors, but I expect that there are errors of grammar and possibly content that I did not discover before finalizing the note.

## 2023-08-23 ENCOUNTER — OFFICE VISIT (OUTPATIENT)
Dept: MEDICAL GROUP | Facility: PHYSICIAN GROUP | Age: 28
End: 2023-08-23
Payer: OTHER GOVERNMENT

## 2023-08-23 ENCOUNTER — HOSPITAL ENCOUNTER (OUTPATIENT)
Facility: MEDICAL CENTER | Age: 28
End: 2023-08-23
Attending: STUDENT IN AN ORGANIZED HEALTH CARE EDUCATION/TRAINING PROGRAM
Payer: OTHER GOVERNMENT

## 2023-08-23 VITALS
DIASTOLIC BLOOD PRESSURE: 60 MMHG | OXYGEN SATURATION: 97 % | HEIGHT: 69 IN | WEIGHT: 150.4 LBS | TEMPERATURE: 98.7 F | HEART RATE: 65 BPM | SYSTOLIC BLOOD PRESSURE: 112 MMHG | BODY MASS INDEX: 22.28 KG/M2

## 2023-08-23 DIAGNOSIS — R19.7 DIARRHEA OF PRESUMED INFECTIOUS ORIGIN: ICD-10-CM

## 2023-08-23 DIAGNOSIS — A09 TRAVELER'S DIARRHEA: ICD-10-CM

## 2023-08-23 PROCEDURE — 87177 OVA AND PARASITES SMEARS: CPT

## 2023-08-23 PROCEDURE — 87045 FECES CULTURE AEROBIC BACT: CPT

## 2023-08-23 PROCEDURE — 87209 SMEAR COMPLEX STAIN: CPT

## 2023-08-23 PROCEDURE — 87046 STOOL CULTR AEROBIC BACT EA: CPT

## 2023-08-23 PROCEDURE — 87493 C DIFF AMPLIFIED PROBE: CPT

## 2023-08-23 PROCEDURE — 3074F SYST BP LT 130 MM HG: CPT | Performed by: STUDENT IN AN ORGANIZED HEALTH CARE EDUCATION/TRAINING PROGRAM

## 2023-08-23 PROCEDURE — 87899 AGENT NOS ASSAY W/OPTIC: CPT

## 2023-08-23 PROCEDURE — 99214 OFFICE O/P EST MOD 30 MIN: CPT | Performed by: STUDENT IN AN ORGANIZED HEALTH CARE EDUCATION/TRAINING PROGRAM

## 2023-08-23 PROCEDURE — 3078F DIAST BP <80 MM HG: CPT | Performed by: STUDENT IN AN ORGANIZED HEALTH CARE EDUCATION/TRAINING PROGRAM

## 2023-08-23 RX ORDER — AZITHROMYCIN 500 MG/1
500 TABLET, FILM COATED ORAL DAILY
Qty: 3 TABLET | Refills: 0 | Status: SHIPPED | OUTPATIENT
Start: 2023-08-23

## 2023-08-23 ASSESSMENT — FIBROSIS 4 INDEX: FIB4 SCORE: 0.56

## 2023-08-24 LAB
C DIFF DNA SPEC QL NAA+PROBE: NEGATIVE
C DIFF TOX GENS STL QL NAA+PROBE: NEGATIVE
E COLI SXT1+2 STL IA: NORMAL
SIGNIFICANT IND 70042: NORMAL
SITE SITE: NORMAL
SOURCE SOURCE: NORMAL

## 2023-08-24 NOTE — PROGRESS NOTES
"Subjective:     CC:   Chief Complaint   Patient presents with    Bowel Problem     Sense July 20 th after traveling to [place] stomach cramps and gas        HPI:   Az presents today for evaluation of diarrhea.  This is an acute problem.  Initially onset about a month ago after he returned from a trip to [place].  Symptoms have been more intermittent since that time but worsened again 5 days ago.  He reports 10+ episodes of watery diarrhea per day.  Does report a mucoid appearance of the stool sometimes.  Denies any blood or black color of the stool.  Associated symptoms include intermittent abdominal cramping and poor appetite.  Denies any fevers, chills, nausea, or vomiting.  Symptoms seem to be improving today.       Past medical, family, and social history reviewed by me in Epic chart today.   Medications and allergies reviewed in Epic chart by me today.       ROS:  See HPI      Objective:     Exam:  /60 (BP Location: Left arm, Patient Position: Sitting, BP Cuff Size: Adult)   Pulse 65   Temp 37.1 °C (98.7 °F) (Temporal)   Ht 1.753 m (5' 9\")   Wt 68.2 kg (150 lb 6.4 oz)   SpO2 97%   BMI 22.21 kg/m²  Body mass index is 22.21 kg/m².    Physical Exam  Constitutional:       General: He is not in acute distress.     Appearance: He is not toxic-appearing.   HENT:      Mouth/Throat:      Mouth: Mucous membranes are moist.   Eyes:      Extraocular Movements: Extraocular movements intact.   Pulmonary:      Effort: Pulmonary effort is normal.   Abdominal:      General: There is no distension.      Palpations: Abdomen is soft.      Tenderness: There is no abdominal tenderness. There is no right CVA tenderness, left CVA tenderness, guarding or rebound.   Musculoskeletal:      Cervical back: Neck supple.   Skin:     General: Skin is warm and dry.      Findings: No lesion or rash.   Neurological:      Mental Status: He is alert and oriented to person, place, and time.   Psychiatric:         Mood and Affect: Mood " normal.           Assessment & Plan:     28 y.o. male with the following -     1. Diarrhea of presumed infectious origin  Acute problem.  Symptoms are mildly improved today.  Abdominal exam is benign.  Vital signs are stable.  Patient is drinking fluids and eating small amounts of food.  Discussed BRAT diet, advance as tolerated.  I have ordered stool studies.  We will treat empirically for travelers diarrhea given onset after trip to [facility].  Follow-up and ER precautions were reviewed with the patient.   - Cdiff By PCR Rflx Toxin; Future  - Complete O&P  - CULTURE STOOL; Future    2. Traveler's diarrhea  - azithromycin (ZITHROMAX) 500 MG tablet; Take 1 Tablet by mouth every day.  Dispense: 3 Tablet; Refill: 0          Return if symptoms worsen or fail to improve.    Please note that this dictation was created using voice recognition software. I have made every reasonable attempt to correct obvious errors, but I expect that there are errors of grammar and possibly content that I did not discover before finalizing the note.

## 2023-08-26 LAB
BACTERIA STL CULT: NORMAL
E COLI SXT1+2 STL IA: NORMAL
SIGNIFICANT IND 70042: NORMAL
SITE SITE: NORMAL
SOURCE SOURCE: NORMAL

## 2023-08-29 ENCOUNTER — APPOINTMENT (OUTPATIENT)
Dept: MEDICAL GROUP | Facility: PHYSICIAN GROUP | Age: 28
End: 2023-08-29
Payer: OTHER GOVERNMENT

## 2023-08-29 LAB — OVA AND PARASITE, FECAL INTERPRETATION Q0595: NEGATIVE

## 2023-12-26 NOTE — PATIENT INSTRUCTIONS
Lateral Collateral Knee Ligament Sprain, Phase II Rehab  These exercises are more advanced than phase I exercises. Ask your health care provider which exercises are safe for you. Do exercises exactly as told by your health care provider and adjust them as directed. It is normal to feel mild stretching, pulling, tightness, or discomfort as you do these exercises, but you should stop right away if you feel sudden pain or your pain gets worse. Do not begin these exercises until told by your health care provider.  Stretching and range of motion exercise  This exercise warms up your muscles and joints and improves the movement and flexibility of your knee. This exercise also helps to relieve pain and stiffness.  Exercise A: Quadriceps, prone  1. Lie on your abdomen on a firm surface, such as a bed or padded floor.  2. Bend your left / right knee and hold your ankle. If you cannot reach your ankle or pant leg, loop a belt around your foot and grab the belt instead.  3. Gently pull your heel toward your buttocks. Your knee should not slide out to the side. You should feel a stretch in the front of your thigh and knee.  4. Hold this position for __________ seconds.  Repeat __________ times. Complete this stretch __________ times a day.  Strengthening exercises  These exercises build strength and endurance in your knee. Endurance is the ability to use your muscles for a long time, even after they get tired.  Exercise B: Bridge walkouts (hip extensors and knee flexors)  1. Lie on your back on a firm surface with your knees bent and your feet flat on the floor.  2. Tighten your buttocks muscles and lift your bottom off the floor until your trunk is level with your thighs.  3. Keep your hips steady as you walk your feet away from your body as far as you can.  4. Without resting, walk your feet back to the starting position.  5. Slowly lower your hips to the floor.  6. Let your buttocks muscles relax completely after each  repetition.  Repeat __________ times. Complete this exercise __________ times a day.  Exercise C: Wall slides (hip and knee extensors)  1. Lean your back against a smooth wall or door, and walk your feet out 18-24 inches (46-61 cm) from it.  2. Place your feet hip-width apart.  3. Slowly slide down the wall or door until your knees bend __________ degrees. Keep your knees over your heels, not over your toes. Keep your knees in line with your hips.  4. Hold for __________ seconds.  5. Stand up to rest for __________ seconds after each repetition.  Repeat __________ times. Complete this exercise __________ times a day.  Exercise D: Single leg deadlift (eccentric hamstring)  1. Stand near a railing or wall so you can hold onto it as needed during the exercise.  2. Stand on your left / right foot. Keep your left / right knee slightly bent.  3. Reach your left / right hand as far down to the floor as you can while keeping your balance. Keep your left / right knee just slightly bent.  4. Hold this position for __________ seconds.  5. Slowly return to standing.  Repeat __________ times. Complete this exercise __________ times a day.  Exercise E: Straight leg raises (hip abductors)  1. Lie on your side with your left / right leg in the top position. Lie so your head, shoulder, knee, and hip line up. You may bend your bottom knee to help you keep your balance.  2. Lift your top leg up 4-6 inches (10-15 cm) while keeping your toes pointed straight ahead.  3. Hold this position for __________ seconds.  4. Slowly lower your leg to the starting position.  5. Allow your muscles to relax completely after each repetition.  Repeat __________ times. Complete this exercise __________ times a day.  This information is not intended to replace advice given to you by your health care provider. Make sure you discuss any questions you have with your health care provider.  Document Released: 04/10/2007 Document Revised: 08/22/2017 Document  Reviewed: 10/26/2016  [employer] Interactive Patient Education © 2017 [employer]    Muscle Strain  A muscle strain is an injury that occurs when a muscle is stretched beyond its normal length. Usually a small number of muscle fibers are torn when this happens. Muscle strain is rated in degrees. First-degree strains have the least amount of muscle fiber tearing and pain. Second-degree and third-degree strains have increasingly more tearing and pain.  Usually, recovery from muscle strain takes 1-2 weeks. Complete healing takes 5-6 weeks.  What are the causes?  Muscle strain happens when a sudden, violent force placed on a muscle stretches it too far. This may occur with lifting, sports, or a fall.  What increases the risk?  Muscle strain is especially common in athletes.  What are the signs or symptoms?  At the site of the muscle strain, there may be:  · Pain.  · Bruising.  · Swelling.  · Difficulty using the muscle due to pain or lack of normal function.  How is this diagnosed?  Your health care provider will perform a physical exam and ask about your medical history.  How is this treated?  Often, the best treatment for a muscle strain is resting, icing, and applying cold compresses to the injured area.  Follow these instructions at home:  · Use the PRICE method of treatment to promote muscle healing during the first 2-3 days after your injury. The PRICE method involves:  ¨ Protecting the muscle from being injured again.  ¨ Restricting your activity and resting the injured body part.  ¨ Icing your injury. To do this, put ice in a plastic bag. Place a towel between your skin and the bag. Then, apply the ice and leave it on from 15-20 minutes each hour. After the third day, switch to moist heat packs.  ¨ Apply compression to the injured area with a splint or elastic bandage. Be careful not to wrap it too tightly. This may interfere with blood circulation or increase swelling.  ¨ Elevate the injured body part above the  level of your heart as often as you can.  · Only take over-the-counter or prescription medicines for pain, discomfort, or fever as directed by your health care provider.  · Warming up prior to exercise helps to prevent future muscle strains.  Contact a health care provider if:  · You have increasing pain or swelling in the injured area.  · You have numbness, tingling, or a significant loss of strength in the injured area.  This information is not intended to replace advice given to you by your health care provider. Make sure you discuss any questions you have with your health care provider.  Document Released: 12/18/2006 Document Revised: 05/25/2017 Document Reviewed: 07/17/2014  [employer] Interactive Patient Education © 2017 [employer]     Abdominal Pain, N/V/D

## 2024-04-30 ENCOUNTER — APPOINTMENT (OUTPATIENT)
Dept: MEDICAL GROUP | Facility: PHYSICIAN GROUP | Age: 29
End: 2024-04-30
Payer: OTHER GOVERNMENT

## 2024-04-30 VITALS
OXYGEN SATURATION: 98 % | HEART RATE: 73 BPM | TEMPERATURE: 98.5 F | HEIGHT: 69 IN | BODY MASS INDEX: 23.55 KG/M2 | WEIGHT: 159 LBS | SYSTOLIC BLOOD PRESSURE: 118 MMHG | DIASTOLIC BLOOD PRESSURE: 62 MMHG

## 2024-04-30 DIAGNOSIS — Z00.00 PREVENTATIVE HEALTH CARE: ICD-10-CM

## 2024-04-30 DIAGNOSIS — F33.42 RECURRENT MAJOR DEPRESSIVE DISORDER, IN FULL REMISSION (HCC): ICD-10-CM

## 2024-04-30 PROBLEM — R19.7 DIARRHEA OF PRESUMED INFECTIOUS ORIGIN: Status: RESOLVED | Noted: 2023-08-23 | Resolved: 2024-04-30

## 2024-04-30 ASSESSMENT — ENCOUNTER SYMPTOMS
INSOMNIA: 1
SHORTNESS OF BREATH: 0
CHILLS: 0
DIZZINESS: 0
HEADACHES: 0
DEPRESSION: 1
FEVER: 0
NERVOUS/ANXIOUS: 0

## 2024-04-30 ASSESSMENT — PATIENT HEALTH QUESTIONNAIRE - PHQ9
SUM OF ALL RESPONSES TO PHQ QUESTIONS 1-9: 14
5. POOR APPETITE OR OVEREATING: 1 - SEVERAL DAYS
CLINICAL INTERPRETATION OF PHQ2 SCORE: 3

## 2024-04-30 ASSESSMENT — LIFESTYLE VARIABLES: SUBSTANCE_ABUSE: 0

## 2024-04-30 ASSESSMENT — FIBROSIS 4 INDEX: FIB4 SCORE: 0.58

## 2024-04-30 NOTE — PROGRESS NOTES
Subjective:     CC:   Chief Complaint   Patient presents with    Requesting Labs     General labs ; vit D vit B levels        HPI:   Az presents today with    Problem   Recurrent Major Depressive Disorder, in Full Remission (Hcc)    Chronic condition, diagnosed in 2022. He does not take medication for this and has never been on medication for this. He has started seeing a therapist a few months ago, he is now seeing her every other day.  About 6 weeks ago, he was struggling with suicidal ideation.  He reports that he drove to the [place] with his gun and thought about committing suicide, but he called a close friend and talked through with them.  He has no history of suicide attempts.  He has been seeing his therapist very frequently since that time and his symptoms are improving.  He denies any recent suicidal thoughts.  He lives with his wife and [id number] son.  He has good relationships with his friends.    Depression Screening 4/30/24  Little interest or pleasure in doing things?  2 - more than half the days   Feeling down, depressed , or hopeless? 1 - several days   Trouble falling or staying asleep, or sleeping too much?  2 - more than half the days   Feeling tired or having little energy?  2 - more than half the days   Poor appetite or overeating?  1 - several days   Feeling bad about yourself - or that you are a failure or have let yourself or your family down? 1 - several days   Trouble concentrating on things, such as reading the newspaper or watching television? 2 - more than half the days   Moving or speaking so slowly that other people could have noticed.  Or the opposite - being so fidgety or restless that you have been moving around a lot more than usual?  2 - more than half the days   Thoughts that you would be better off dead, or of hurting yourself?  1 - several days   Patient Health Questionnaire Score: 14     Interpretation of PHQ-9 Total Score   Score Severity   1-4 No Depression  "  5-9 Mild Depression   10-14 Moderate Depression   15-19 Moderately Severe Depression   20-27 Severe Depression         Diarrhea of Presumed Infectious Origin (Resolved)         Past medical, family, and social history reviewed by me in Epic chart today.   Medications and allergies reviewed in Epic chart by me today.       Health Maintenance: Completed    ROS:  Review of Systems   Constitutional:  Negative for chills and fever.   Respiratory:  Negative for shortness of breath.    Cardiovascular:  Negative for chest pain.   Neurological:  Negative for dizziness and headaches.   Psychiatric/Behavioral:  Positive for depression and suicidal ideas. Negative for substance abuse. The patient has insomnia. The patient is not nervous/anxious.        Objective:     Exam:  /62 (BP Location: Left arm, Patient Position: Sitting, BP Cuff Size: Adult long)   Pulse 73   Temp 36.9 °C (98.5 °F) (Temporal)   Ht 1.753 m (5' 9\")   Wt 72.1 kg (159 lb)   SpO2 98%   BMI 23.48 kg/m²  Body mass index is 23.48 kg/m².    Physical Exam  Constitutional:       General: He is not in acute distress.  HENT:      Mouth/Throat:      Mouth: Mucous membranes are moist.   Eyes:      Extraocular Movements: Extraocular movements intact.   Cardiovascular:      Rate and Rhythm: Normal rate and regular rhythm.      Heart sounds: No murmur heard.     No friction rub. No gallop.   Pulmonary:      Effort: Pulmonary effort is normal.      Breath sounds: No wheezing, rhonchi or rales.   Musculoskeletal:      Cervical back: Neck supple.   Skin:     General: Skin is warm and dry.   Neurological:      Mental Status: He is alert.   Psychiatric:         Mood and Affect: Mood normal.         Behavior: Behavior normal.         Thought Content: Thought content normal.         Judgment: Judgment normal.      Comments: No active SI/HI.         Assessment & Plan:     29 y.o. male with the following -     1. Recurrent major depressive disorder, in full remission " (HCC)  Chronic, uncontrolled.  PHQ-9 indicates moderate depression.  Patient does not currently have suicidal thoughts, however he was struggling with suicidal ideation a month or 2 ago.  He is following closely with a therapist and his symptoms are improving.  Discussed options for treatment with medication, but patient declines at this time.  We have discussed sleep hygiene and over-the-counter sleep aids for insomnia.  We have discussed resources including the suicide hotline and ER precautions for suicidal ideation.  He was scheduled for a follow-up appointment in 1 month.    2. Preventative health care  - Basic Metabolic Panel; Future  - VITAMIN D,25 HYDROXY (DEFICIENCY); Future  - TSH WITH REFLEX TO FT4; Future  - CBC WITHOUT DIFFERENTIAL; Future            Return in about 4 weeks (around 5/28/2024) for follow up depression.    Please note that this dictation was created using voice recognition software. I have made every reasonable attempt to correct obvious errors, but I expect that there are errors of grammar and possibly content that I did not discover before finalizing the note.

## 2024-05-01 ENCOUNTER — HOSPITAL ENCOUNTER (OUTPATIENT)
Dept: LAB | Facility: MEDICAL CENTER | Age: 29
End: 2024-05-01
Attending: STUDENT IN AN ORGANIZED HEALTH CARE EDUCATION/TRAINING PROGRAM
Payer: OTHER GOVERNMENT

## 2024-05-01 DIAGNOSIS — Z00.00 PREVENTATIVE HEALTH CARE: ICD-10-CM

## 2024-05-01 LAB
ANION GAP SERPL CALC-SCNC: 11 MMOL/L (ref 7–16)
BUN SERPL-MCNC: 12 MG/DL (ref 8–22)
CALCIUM SERPL-MCNC: 9.4 MG/DL (ref 8.5–10.5)
CHLORIDE SERPL-SCNC: 102 MMOL/L (ref 96–112)
CO2 SERPL-SCNC: 28 MMOL/L (ref 20–33)
CREAT SERPL-MCNC: 1.18 MG/DL (ref 0.5–1.4)
ERYTHROCYTE [DISTWIDTH] IN BLOOD BY AUTOMATED COUNT: 36.3 FL (ref 35.9–50)
FASTING STATUS PATIENT QL REPORTED: NORMAL
GFR SERPLBLD CREATININE-BSD FMLA CKD-EPI: 86 ML/MIN/1.73 M 2
GLUCOSE SERPL-MCNC: 85 MG/DL (ref 65–99)
HCT VFR BLD AUTO: 45.6 % (ref 42–52)
HGB BLD-MCNC: 15.9 G/DL (ref 14–18)
MCH RBC QN AUTO: 28.6 PG (ref 27–33)
MCHC RBC AUTO-ENTMCNC: 34.9 G/DL (ref 32.3–36.5)
MCV RBC AUTO: 82 FL (ref 81.4–97.8)
PLATELET # BLD AUTO: 262 K/UL (ref 164–446)
PMV BLD AUTO: 10.1 FL (ref 9–12.9)
POTASSIUM SERPL-SCNC: 3.8 MMOL/L (ref 3.6–5.5)
RBC # BLD AUTO: 5.56 M/UL (ref 4.7–6.1)
SODIUM SERPL-SCNC: 141 MMOL/L (ref 135–145)
WBC # BLD AUTO: 5.5 K/UL (ref 4.8–10.8)

## 2024-05-02 LAB
25(OH)D3 SERPL-MCNC: 24 NG/ML (ref 30–100)
TSH SERPL DL<=0.005 MIU/L-ACNC: 1.34 UIU/ML (ref 0.38–5.33)

## 2024-05-28 ENCOUNTER — APPOINTMENT (OUTPATIENT)
Dept: MEDICAL GROUP | Facility: PHYSICIAN GROUP | Age: 29
End: 2024-05-28
Payer: OTHER GOVERNMENT

## 2024-07-09 ENCOUNTER — OFFICE VISIT (OUTPATIENT)
Dept: URGENT CARE | Facility: PHYSICIAN GROUP | Age: 29
End: 2024-07-09
Payer: OTHER GOVERNMENT

## 2024-07-09 ENCOUNTER — HOSPITAL ENCOUNTER (EMERGENCY)
Facility: MEDICAL CENTER | Age: 29
End: 2024-07-09
Attending: EMERGENCY MEDICINE
Payer: OTHER GOVERNMENT

## 2024-07-09 VITALS
WEIGHT: 158 LBS | HEIGHT: 69 IN | RESPIRATION RATE: 16 BRPM | OXYGEN SATURATION: 100 % | HEART RATE: 59 BPM | TEMPERATURE: 97.9 F | DIASTOLIC BLOOD PRESSURE: 80 MMHG | SYSTOLIC BLOOD PRESSURE: 130 MMHG | BODY MASS INDEX: 23.4 KG/M2

## 2024-07-09 VITALS
DIASTOLIC BLOOD PRESSURE: 81 MMHG | HEART RATE: 53 BPM | OXYGEN SATURATION: 98 % | BODY MASS INDEX: 23.41 KG/M2 | WEIGHT: 158.07 LBS | SYSTOLIC BLOOD PRESSURE: 134 MMHG | HEIGHT: 69 IN | RESPIRATION RATE: 16 BRPM | TEMPERATURE: 97.6 F

## 2024-07-09 DIAGNOSIS — R10.84 GENERALIZED ABDOMINAL PAIN: ICD-10-CM

## 2024-07-09 DIAGNOSIS — K92.1 BLACK TARRY STOOLS: ICD-10-CM

## 2024-07-09 DIAGNOSIS — R10.13 EPIGASTRIC PAIN: ICD-10-CM

## 2024-07-09 LAB
ALBUMIN SERPL BCP-MCNC: 4.7 G/DL (ref 3.2–4.9)
ALBUMIN/GLOB SERPL: 1.6 G/DL
ALP SERPL-CCNC: 81 U/L (ref 30–99)
ALT SERPL-CCNC: 20 U/L (ref 2–50)
ANION GAP SERPL CALC-SCNC: 9 MMOL/L (ref 7–16)
APPEARANCE UR: NORMAL
AST SERPL-CCNC: 18 U/L (ref 12–45)
BASOPHILS # BLD AUTO: 0.8 % (ref 0–1.8)
BASOPHILS # BLD: 0.04 K/UL (ref 0–0.12)
BILIRUB SERPL-MCNC: 0.7 MG/DL (ref 0.1–1.5)
BILIRUB UR STRIP-MCNC: NEGATIVE MG/DL
BUN SERPL-MCNC: 9 MG/DL (ref 8–22)
CALCIUM ALBUM COR SERPL-MCNC: 8.8 MG/DL (ref 8.5–10.5)
CALCIUM SERPL-MCNC: 9.4 MG/DL (ref 8.4–10.2)
CHLORIDE SERPL-SCNC: 106 MMOL/L (ref 96–112)
CO2 SERPL-SCNC: 28 MMOL/L (ref 20–33)
COLOR UR AUTO: YELLOW
CREAT SERPL-MCNC: 0.86 MG/DL (ref 0.5–1.4)
EOSINOPHIL # BLD AUTO: 0.07 K/UL (ref 0–0.51)
EOSINOPHIL NFR BLD: 1.3 % (ref 0–6.9)
ERYTHROCYTE [DISTWIDTH] IN BLOOD BY AUTOMATED COUNT: 36.2 FL (ref 35.9–50)
GFR SERPLBLD CREATININE-BSD FMLA CKD-EPI: 120 ML/MIN/1.73 M 2
GLOBULIN SER CALC-MCNC: 2.9 G/DL (ref 1.9–3.5)
GLUCOSE SERPL-MCNC: 104 MG/DL (ref 65–99)
GLUCOSE UR STRIP.AUTO-MCNC: NEGATIVE MG/DL
HCT VFR BLD AUTO: 44.8 % (ref 42–52)
HGB BLD-MCNC: 15.3 G/DL (ref 14–18)
IMM GRANULOCYTES # BLD AUTO: 0.01 K/UL (ref 0–0.11)
IMM GRANULOCYTES NFR BLD AUTO: 0.2 % (ref 0–0.9)
KETONES UR STRIP.AUTO-MCNC: NEGATIVE MG/DL
LEUKOCYTE ESTERASE UR QL STRIP.AUTO: NEGATIVE
LIPASE SERPL-CCNC: 19 U/L (ref 11–82)
LYMPHOCYTES # BLD AUTO: 1.49 K/UL (ref 1–4.8)
LYMPHOCYTES NFR BLD: 28.6 % (ref 22–41)
MCH RBC QN AUTO: 28.5 PG (ref 27–33)
MCHC RBC AUTO-ENTMCNC: 34.2 G/DL (ref 32.3–36.5)
MCV RBC AUTO: 83.6 FL (ref 81.4–97.8)
MONOCYTES # BLD AUTO: 0.29 K/UL (ref 0–0.85)
MONOCYTES NFR BLD AUTO: 5.6 % (ref 0–13.4)
NEUTROPHILS # BLD AUTO: 3.31 K/UL (ref 1.82–7.42)
NEUTROPHILS NFR BLD: 63.5 % (ref 44–72)
NITRITE UR QL STRIP.AUTO: NEGATIVE
NRBC # BLD AUTO: 0 K/UL
NRBC BLD-RTO: 0 /100 WBC (ref 0–0.2)
PH UR STRIP.AUTO: 7 [PH] (ref 5–8)
PLATELET # BLD AUTO: 237 K/UL (ref 164–446)
PMV BLD AUTO: 9.5 FL (ref 9–12.9)
POTASSIUM SERPL-SCNC: 4.1 MMOL/L (ref 3.6–5.5)
PROT SERPL-MCNC: 7.6 G/DL (ref 6–8.2)
PROT UR QL STRIP: NEGATIVE MG/DL
RBC # BLD AUTO: 5.36 M/UL (ref 4.7–6.1)
RBC UR QL AUTO: NEGATIVE
SODIUM SERPL-SCNC: 143 MMOL/L (ref 135–145)
SP GR UR STRIP.AUTO: 1.02
UROBILINOGEN UR STRIP-MCNC: 0.2 MG/DL
WBC # BLD AUTO: 5.2 K/UL (ref 4.8–10.8)

## 2024-07-09 PROCEDURE — 36415 COLL VENOUS BLD VENIPUNCTURE: CPT

## 2024-07-09 PROCEDURE — 81002 URINALYSIS NONAUTO W/O SCOPE: CPT | Performed by: NURSE PRACTITIONER

## 2024-07-09 PROCEDURE — 3079F DIAST BP 80-89 MM HG: CPT | Performed by: NURSE PRACTITIONER

## 2024-07-09 PROCEDURE — 700102 HCHG RX REV CODE 250 W/ 637 OVERRIDE(OP): Performed by: EMERGENCY MEDICINE

## 2024-07-09 PROCEDURE — 85025 COMPLETE CBC W/AUTO DIFF WBC: CPT

## 2024-07-09 PROCEDURE — 83690 ASSAY OF LIPASE: CPT

## 2024-07-09 PROCEDURE — 3075F SYST BP GE 130 - 139MM HG: CPT | Performed by: NURSE PRACTITIONER

## 2024-07-09 PROCEDURE — 99284 EMERGENCY DEPT VISIT MOD MDM: CPT

## 2024-07-09 PROCEDURE — A9270 NON-COVERED ITEM OR SERVICE: HCPCS | Performed by: EMERGENCY MEDICINE

## 2024-07-09 PROCEDURE — 80053 COMPREHEN METABOLIC PANEL: CPT

## 2024-07-09 PROCEDURE — 99213 OFFICE O/P EST LOW 20 MIN: CPT | Performed by: NURSE PRACTITIONER

## 2024-07-09 RX ORDER — TOBRAMYCIN AND DEXAMETHASONE 3; 1 MG/ML; MG/ML
1 SUSPENSION/ DROPS OPHTHALMIC DAILY
COMMUNITY
Start: 2024-07-02

## 2024-07-09 RX ORDER — FAMOTIDINE 20 MG/1
40 TABLET, FILM COATED ORAL ONCE
Status: COMPLETED | OUTPATIENT
Start: 2024-07-09 | End: 2024-07-09

## 2024-07-09 RX ORDER — OMEPRAZOLE 40 MG/1
40 CAPSULE, DELAYED RELEASE ORAL DAILY
Qty: 30 CAPSULE | Refills: 0 | Status: SHIPPED | OUTPATIENT
Start: 2024-07-09

## 2024-07-09 RX ORDER — IBUPROFEN 200 MG
400 TABLET ORAL EVERY 6 HOURS PRN
COMMUNITY

## 2024-07-09 RX ORDER — FAMOTIDINE 40 MG/1
40 TABLET, FILM COATED ORAL DAILY
Qty: 30 TABLET | Refills: 0 | Status: SHIPPED | OUTPATIENT
Start: 2024-07-09

## 2024-07-09 RX ADMIN — FAMOTIDINE 40 MG: 20 TABLET ORAL at 11:41

## 2024-07-09 RX ADMIN — LIDOCAINE HYDROCHLORIDE 30 ML: 20 SOLUTION ORAL; TOPICAL at 10:29

## 2024-07-09 ASSESSMENT — ENCOUNTER SYMPTOMS
VOMITING: 0
ABDOMINAL PAIN: 1

## 2024-07-09 ASSESSMENT — FIBROSIS 4 INDEX
FIB4 SCORE: 0.53
FIB4 SCORE: 0.53

## 2024-07-11 ENCOUNTER — HOSPITAL ENCOUNTER (EMERGENCY)
Facility: MEDICAL CENTER | Age: 29
End: 2024-07-11
Attending: EMERGENCY MEDICINE
Payer: OTHER GOVERNMENT

## 2024-07-11 ENCOUNTER — APPOINTMENT (OUTPATIENT)
Dept: RADIOLOGY | Facility: MEDICAL CENTER | Age: 29
End: 2024-07-11
Attending: EMERGENCY MEDICINE
Payer: OTHER GOVERNMENT

## 2024-07-11 VITALS
RESPIRATION RATE: 16 BRPM | OXYGEN SATURATION: 98 % | SYSTOLIC BLOOD PRESSURE: 135 MMHG | DIASTOLIC BLOOD PRESSURE: 81 MMHG | HEIGHT: 69 IN | HEART RATE: 66 BPM | BODY MASS INDEX: 23.05 KG/M2 | TEMPERATURE: 98.3 F | WEIGHT: 155.65 LBS

## 2024-07-11 DIAGNOSIS — R11.2 NAUSEA AND VOMITING, UNSPECIFIED VOMITING TYPE: ICD-10-CM

## 2024-07-11 DIAGNOSIS — R10.13 EPIGASTRIC ABDOMINAL PAIN: ICD-10-CM

## 2024-07-11 LAB
ALBUMIN SERPL BCP-MCNC: 4.9 G/DL (ref 3.2–4.9)
ALBUMIN/GLOB SERPL: 1.6 G/DL
ALP SERPL-CCNC: 85 U/L (ref 30–99)
ALT SERPL-CCNC: 19 U/L (ref 2–50)
ANION GAP SERPL CALC-SCNC: 12 MMOL/L (ref 7–16)
AST SERPL-CCNC: 18 U/L (ref 12–45)
BASOPHILS # BLD AUTO: 0.5 % (ref 0–1.8)
BASOPHILS # BLD: 0.05 K/UL (ref 0–0.12)
BILIRUB SERPL-MCNC: 0.9 MG/DL (ref 0.1–1.5)
BUN SERPL-MCNC: 10 MG/DL (ref 8–22)
CALCIUM ALBUM COR SERPL-MCNC: 8.9 MG/DL (ref 8.5–10.5)
CALCIUM SERPL-MCNC: 9.6 MG/DL (ref 8.4–10.2)
CHLORIDE SERPL-SCNC: 102 MMOL/L (ref 96–112)
CO2 SERPL-SCNC: 28 MMOL/L (ref 20–33)
CREAT SERPL-MCNC: 0.91 MG/DL (ref 0.5–1.4)
EOSINOPHIL # BLD AUTO: 0.07 K/UL (ref 0–0.51)
EOSINOPHIL NFR BLD: 0.8 % (ref 0–6.9)
ERYTHROCYTE [DISTWIDTH] IN BLOOD BY AUTOMATED COUNT: 36.6 FL (ref 35.9–50)
GFR SERPLBLD CREATININE-BSD FMLA CKD-EPI: 117 ML/MIN/1.73 M 2
GLOBULIN SER CALC-MCNC: 3 G/DL (ref 1.9–3.5)
GLUCOSE SERPL-MCNC: 102 MG/DL (ref 65–99)
HCT VFR BLD AUTO: 46.4 % (ref 42–52)
HGB BLD-MCNC: 15.9 G/DL (ref 14–18)
IMM GRANULOCYTES # BLD AUTO: 0.02 K/UL (ref 0–0.11)
IMM GRANULOCYTES NFR BLD AUTO: 0.2 % (ref 0–0.9)
LIPASE SERPL-CCNC: 23 U/L (ref 11–82)
LYMPHOCYTES # BLD AUTO: 1.55 K/UL (ref 1–4.8)
LYMPHOCYTES NFR BLD: 16.6 % (ref 22–41)
MCH RBC QN AUTO: 28.6 PG (ref 27–33)
MCHC RBC AUTO-ENTMCNC: 34.3 G/DL (ref 32.3–36.5)
MCV RBC AUTO: 83.5 FL (ref 81.4–97.8)
MONOCYTES # BLD AUTO: 0.52 K/UL (ref 0–0.85)
MONOCYTES NFR BLD AUTO: 5.6 % (ref 0–13.4)
NEUTROPHILS # BLD AUTO: 7.12 K/UL (ref 1.82–7.42)
NEUTROPHILS NFR BLD: 76.3 % (ref 44–72)
NRBC # BLD AUTO: 0 K/UL
NRBC BLD-RTO: 0 /100 WBC (ref 0–0.2)
PLATELET # BLD AUTO: 248 K/UL (ref 164–446)
PMV BLD AUTO: 9.6 FL (ref 9–12.9)
POTASSIUM SERPL-SCNC: 4 MMOL/L (ref 3.6–5.5)
PROT SERPL-MCNC: 7.9 G/DL (ref 6–8.2)
RBC # BLD AUTO: 5.56 M/UL (ref 4.7–6.1)
SODIUM SERPL-SCNC: 142 MMOL/L (ref 135–145)
WBC # BLD AUTO: 9.3 K/UL (ref 4.8–10.8)

## 2024-07-11 PROCEDURE — 74177 CT ABD & PELVIS W/CONTRAST: CPT

## 2024-07-11 PROCEDURE — 36415 COLL VENOUS BLD VENIPUNCTURE: CPT

## 2024-07-11 PROCEDURE — 99284 EMERGENCY DEPT VISIT MOD MDM: CPT

## 2024-07-11 PROCEDURE — 83690 ASSAY OF LIPASE: CPT

## 2024-07-11 PROCEDURE — 85025 COMPLETE CBC W/AUTO DIFF WBC: CPT

## 2024-07-11 PROCEDURE — 700102 HCHG RX REV CODE 250 W/ 637 OVERRIDE(OP): Performed by: EMERGENCY MEDICINE

## 2024-07-11 PROCEDURE — 80053 COMPREHEN METABOLIC PANEL: CPT

## 2024-07-11 PROCEDURE — 700117 HCHG RX CONTRAST REV CODE 255: Performed by: EMERGENCY MEDICINE

## 2024-07-11 PROCEDURE — A9270 NON-COVERED ITEM OR SERVICE: HCPCS | Performed by: EMERGENCY MEDICINE

## 2024-07-11 RX ORDER — SUCRALFATE 1 G/1
1 TABLET ORAL
Qty: 120 TABLET | Refills: 3 | Status: SHIPPED | OUTPATIENT
Start: 2024-07-11

## 2024-07-11 RX ORDER — ONDANSETRON 4 MG/1
4 TABLET, ORALLY DISINTEGRATING ORAL EVERY 6 HOURS PRN
Qty: 10 TABLET | Refills: 0 | Status: SHIPPED | OUTPATIENT
Start: 2024-07-11

## 2024-07-11 RX ADMIN — IOHEXOL 100 ML: 350 INJECTION, SOLUTION INTRAVENOUS at 11:31

## 2024-07-11 RX ADMIN — LIDOCAINE HYDROCHLORIDE 30 ML: 20 SOLUTION ORAL; TOPICAL at 10:48

## 2024-07-11 ASSESSMENT — FIBROSIS 4 INDEX: FIB4 SCORE: 0.49

## 2024-07-12 DIAGNOSIS — R10.13 EPIGASTRIC PAIN: ICD-10-CM

## 2024-08-06 ENCOUNTER — OFFICE VISIT (OUTPATIENT)
Dept: MEDICAL GROUP | Facility: PHYSICIAN GROUP | Age: 29
End: 2024-08-06
Payer: OTHER GOVERNMENT

## 2024-08-06 VITALS
WEIGHT: 150 LBS | TEMPERATURE: 97.7 F | BODY MASS INDEX: 22.22 KG/M2 | HEART RATE: 52 BPM | SYSTOLIC BLOOD PRESSURE: 92 MMHG | OXYGEN SATURATION: 96 % | HEIGHT: 69 IN | DIASTOLIC BLOOD PRESSURE: 56 MMHG

## 2024-08-06 DIAGNOSIS — R10.13 EPIGASTRIC PAIN: ICD-10-CM

## 2024-08-06 DIAGNOSIS — F33.9 EPISODE OF RECURRENT MAJOR DEPRESSIVE DISORDER, UNSPECIFIED DEPRESSION EPISODE SEVERITY (HCC): ICD-10-CM

## 2024-08-06 PROCEDURE — 3078F DIAST BP <80 MM HG: CPT | Performed by: STUDENT IN AN ORGANIZED HEALTH CARE EDUCATION/TRAINING PROGRAM

## 2024-08-06 PROCEDURE — 99214 OFFICE O/P EST MOD 30 MIN: CPT | Performed by: STUDENT IN AN ORGANIZED HEALTH CARE EDUCATION/TRAINING PROGRAM

## 2024-08-06 PROCEDURE — 3074F SYST BP LT 130 MM HG: CPT | Performed by: STUDENT IN AN ORGANIZED HEALTH CARE EDUCATION/TRAINING PROGRAM

## 2024-08-06 RX ORDER — OMEPRAZOLE 40 MG/1
40 CAPSULE, DELAYED RELEASE ORAL DAILY
Qty: 30 CAPSULE | Refills: 2 | Status: SHIPPED | OUTPATIENT
Start: 2024-08-06

## 2024-08-06 ASSESSMENT — ENCOUNTER SYMPTOMS
CHILLS: 0
DIZZINESS: 0
HEADACHES: 0
SHORTNESS OF BREATH: 0
FEVER: 0

## 2024-08-06 ASSESSMENT — FIBROSIS 4 INDEX: FIB4 SCORE: 0.48

## 2024-08-06 NOTE — PROGRESS NOTES
Verbal consent was acquired by the patient to use [employer] ambient listening note generation during this visit.    Subjective:     HPI:   History of Present Illness  The patient presents for evaluation of abdominal pain.    He has been experiencing intermittent epigastric abdominal pain since 07/04/2024, which began after a camping trip. The pain, located slightly above his belly button, was severe enough to warrant a visit to urgent care a few days later. He was referred to the ER from urgent care due to severity of symptoms.  He had stable labs and was thought to have duodenitis, and was started on omeprazole and Pepcid.  Two days later, he returned to the ER as his symptoms persisted.  Repeat labs were normal.  Abdomen pelvis CT unremarkable.  He was discharged with omeprazole and Pepcid, as well as Carafate. He was referred to gastroenterology for follow up.Since his ER visits, his pain has significantly improved. He has been taking omeprazole 40 mg daily and famotidine as needed. He denies experiencing nausea, vomiting, or significant heartburn. His pain improves after eating but worsens when he has an empty stomach. His bowel movements are normal, no blood in the stool, and he denies having a fever.    He reports that depression has improved significantly. He is not currently seeing a therapist and is in the process of finding a new one.  He is not on medication for depression but feels like his symptoms are well-controlled at this time.        Past medical, surgical, family, and social history were reviewed and updated.     Medications:    Current Outpatient Medications:     omeprazole (PRILOSEC) 40 MG delayed-release capsule, Take 1 Capsule by mouth every day., Disp: 30 Capsule, Rfl: 2    sucralfate (CARAFATE) 1 GM Tab, Take 1 Tablet by mouth 4 Times a Day,Before Meals and at Bedtime., Disp: 120 Tablet, Rfl: 3    tobramycin-dexamethasone (TOBRADEX) 0.3-0.1 % Suspension, Administer 1 Drop into the right  "eye every day. Pt started on 7/2/2024 for 30 day course, Disp: , Rfl:     ibuprofen (MOTRIN) 200 MG Tab, Take 400 mg by mouth every 6 hours as needed for Mild Pain., Disp: , Rfl:     Cholecalciferol (D3 PO), Take 1 Capsule by mouth every day., Disp: , Rfl:     famotidine (PEPCID) 40 MG Tab, Take 1 Tablet by mouth every day. Take at least every day for 1 week, then as needed, Disp: 30 Tablet, Rfl: 0    valacyclovir (VALTREX) 1 GM Tab, Take 2 tablets twice a day for 1 day when you have a cold sore. Start as soon as possible after onset., Disp: 12 Tablet, Rfl: 1    Allergies:  Penicillins      Health Maintenance: Completed    ROS:  Review of Systems   Constitutional:  Negative for chills and fever.   Respiratory:  Negative for shortness of breath.    Cardiovascular:  Negative for chest pain.   Neurological:  Negative for dizziness and headaches.       Objective:     Exam:  BP 92/56 (BP Location: Left arm, Patient Position: Sitting, BP Cuff Size: Adult)   Pulse (!) 52   Temp 36.5 °C (97.7 °F) (Temporal)   Ht 1.753 m (5' 9\")   Wt 68 kg (150 lb)   SpO2 96%   BMI 22.15 kg/m²  Body mass index is 22.15 kg/m².    Physical Exam  Constitutional:       General: He is not in acute distress.  HENT:      Mouth/Throat:      Mouth: Mucous membranes are moist.   Eyes:      Extraocular Movements: Extraocular movements intact.   Cardiovascular:      Rate and Rhythm: Normal rate and regular rhythm.      Heart sounds: No murmur heard.     No friction rub. No gallop.   Pulmonary:      Effort: Pulmonary effort is normal.      Breath sounds: No wheezing, rhonchi or rales.   Musculoskeletal:      Cervical back: Neck supple.   Skin:     General: Skin is warm and dry.   Neurological:      Mental Status: He is alert.   Psychiatric:         Mood and Affect: Mood normal.         Results      Assessment & Plan:     1. Epigastric pain  omeprazole (PRILOSEC) 40 MG delayed-release capsule      2. Episode of recurrent major depressive disorder, " unspecified depression episode severity (HCC)            Assessment & Plan  1. Abdominal pain.  This is an acute problem, onset about a month ago.  Symptoms have largely improved with daily omeprazole and Pepcid as needed.  He will continue the current regimen for the next 1 to 2 months.  Then we can consider tapering off of the PPI if symptoms are well-controlled.  [facility], he was given the information for scheduling.  He was advised to avoid anti-inflammatories, such as ibuprofen or naproxen, as much as possible.    2. Depression.  Chronic, stable condition.  Symptoms have improved in the past few months.  We will continue to monitor.  Encouraged him to establish with a new therapist.      Follow-up  He will follow up in 3 months.          Please note that this dictation was created using voice recognition software. I have made every reasonable attempt to correct obvious errors, but I expect that there are errors of grammar and possibly content that I did not discover before finalizing the note.

## 2024-08-07 ENCOUNTER — APPOINTMENT (OUTPATIENT)
Dept: MEDICAL GROUP | Facility: PHYSICIAN GROUP | Age: 29
End: 2024-08-07
Payer: OTHER GOVERNMENT

## 2024-08-13 DIAGNOSIS — S05.92XS LEFT EYE INJURY, SEQUELA: ICD-10-CM

## 2024-11-15 ENCOUNTER — OFFICE VISIT (OUTPATIENT)
Dept: MEDICAL GROUP | Facility: PHYSICIAN GROUP | Age: 29
End: 2024-11-15
Payer: OTHER GOVERNMENT

## 2024-11-15 VITALS
BODY MASS INDEX: 23.4 KG/M2 | OXYGEN SATURATION: 97 % | DIASTOLIC BLOOD PRESSURE: 72 MMHG | WEIGHT: 158 LBS | TEMPERATURE: 98.3 F | SYSTOLIC BLOOD PRESSURE: 102 MMHG | HEIGHT: 69 IN | HEART RATE: 67 BPM

## 2024-11-15 DIAGNOSIS — L84 CORN OF FOOT: ICD-10-CM

## 2024-11-15 DIAGNOSIS — F33.42 RECURRENT MAJOR DEPRESSIVE DISORDER, IN FULL REMISSION (HCC): ICD-10-CM

## 2024-11-15 PROCEDURE — 3078F DIAST BP <80 MM HG: CPT | Performed by: STUDENT IN AN ORGANIZED HEALTH CARE EDUCATION/TRAINING PROGRAM

## 2024-11-15 PROCEDURE — 3074F SYST BP LT 130 MM HG: CPT | Performed by: STUDENT IN AN ORGANIZED HEALTH CARE EDUCATION/TRAINING PROGRAM

## 2024-11-15 PROCEDURE — 99214 OFFICE O/P EST MOD 30 MIN: CPT | Performed by: STUDENT IN AN ORGANIZED HEALTH CARE EDUCATION/TRAINING PROGRAM

## 2024-11-15 ASSESSMENT — FIBROSIS 4 INDEX: FIB4 SCORE: 0.48

## 2024-11-15 ASSESSMENT — ENCOUNTER SYMPTOMS
HEADACHES: 0
DEPRESSION: 1
FEVER: 0
DIZZINESS: 0
SHORTNESS OF BREATH: 0
CHILLS: 0

## 2024-11-15 ASSESSMENT — LIFESTYLE VARIABLES: SUBSTANCE_ABUSE: 0

## 2024-11-15 NOTE — PROGRESS NOTES
Subjective:     CC:   Chief Complaint   Patient presents with    Follow-Up     Form for statement of medical examination and duty status     Warts     Wart on right foot. Pt had this for 6 years        HPI:   Az presents today with    Problem   Recurrent Major Depressive Disorder, in Full Remission (Hcc)    Chronic condition, diagnosed 2022. He does not take medication for this and has never been on medication for this. He was following with a therapist, not for several months.  Has struggled with suicidal ideation in the past, including earlier this year, but denies active SI currently. He does note that he has been struggling more with depression recently. He lives with his wife and [id number] son.  He has good relationships with his friends.         He also mentions skin lesion of his right foot, which has been there for 6 years. Generally does not bother him but is occasionally painful.      Past medical, surgical, family, and social history were reviewed and updated.     Medications:    Current Outpatient Medications:     omeprazole (PRILOSEC) 40 MG delayed-release capsule, Take 1 Capsule by mouth every day., Disp: 30 Capsule, Rfl: 2    Cholecalciferol (D3 PO), Take 1 Capsule by mouth every day., Disp: , Rfl:     famotidine (PEPCID) 40 MG Tab, Take 1 Tablet by mouth every day. Take at least every day for 1 week, then as needed, Disp: 30 Tablet, Rfl: 0    valacyclovir (VALTREX) 1 GM Tab, Take 2 tablets twice a day for 1 day when you have a cold sore. Start as soon as possible after onset., Disp: 12 Tablet, Rfl: 1    Allergies:  Penicillins      Health Maintenance: Completed    ROS:  Review of Systems   Constitutional:  Negative for chills and fever.   Respiratory:  Negative for shortness of breath.    Cardiovascular:  Negative for chest pain.   Neurological:  Negative for dizziness and headaches.   Psychiatric/Behavioral:  Positive for depression. Negative for substance abuse and suicidal ideas.   "      Objective:     Exam:  /72 (BP Location: Left arm, Patient Position: Sitting, BP Cuff Size: Adult)   Pulse 67   Temp 36.8 °C (98.3 °F) (Temporal)   Ht 1.753 m (5' 9\")   Wt 71.7 kg (158 lb)   SpO2 97%   BMI 23.33 kg/m²  Body mass index is 23.33 kg/m².    Physical Exam  Constitutional:       General: He is not in acute distress.  HENT:      Mouth/Throat:      Mouth: Mucous membranes are moist.   Eyes:      Extraocular Movements: Extraocular movements intact.   Pulmonary:      Effort: Pulmonary effort is normal. No respiratory distress.   Musculoskeletal:      Cervical back: Neck supple.   Skin:     Comments: Corn of right foot.   Neurological:      Mental Status: He is alert.   Psychiatric:         Mood and Affect: Mood normal.         Behavior: Behavior normal.         Thought Content: Thought content normal.         Judgment: Judgment normal.      Comments: No SI/HI           Assessment & Plan:     29 y.o. male with the following -     1. Recurrent major depressive disorder, in full remission (HCC)  This is a chronic problem, currently uncontrolled.  He denies any suicidal ideation.  I have strongly encouraged that he reestablish with a therapist and will provide a referral to help get him established with someone who accepts his insurance.  We had a discussion today about medication options for treatment, but he is very reluctant to start medication.  He states he will think about it.  We will schedule a follow-up appointment in 3 months to reevaluate, he will come in sooner if needed or if he would like to talk more about medication.  - Referral to Behavioral Health    2. [place] of foot  Chronic in nature.  Will refer to podiatry for further management.  - Referral to Podiatry            Return in about 3 months (around 2/15/2025) for follow up depression.    Please note that this dictation was created using voice recognition software. I have made every reasonable attempt to correct obvious errors, " but I expect that there are errors of grammar and possibly content that I did not discover before finalizing the note.

## 2025-01-27 ENCOUNTER — OFFICE VISIT (OUTPATIENT)
Dept: BEHAVIORAL HEALTH | Facility: CLINIC | Age: 30
End: 2025-01-27
Payer: OTHER GOVERNMENT

## 2025-01-27 DIAGNOSIS — F33.1 MODERATE EPISODE OF RECURRENT MAJOR DEPRESSIVE DISORDER (HCC): ICD-10-CM

## 2025-01-27 PROCEDURE — 90791 PSYCH DIAGNOSTIC EVALUATION: CPT | Performed by: MARRIAGE & FAMILY THERAPIST

## 2025-01-27 ASSESSMENT — ANXIETY QUESTIONNAIRES
1. FEELING NERVOUS, ANXIOUS, OR ON EDGE: SEVERAL DAYS
3. WORRYING TOO MUCH ABOUT DIFFERENT THINGS: MORE THAN HALF THE DAYS
7. FEELING AFRAID AS IF SOMETHING AWFUL MIGHT HAPPEN: SEVERAL DAYS
2. NOT BEING ABLE TO STOP OR CONTROL WORRYING: SEVERAL DAYS
GAD7 TOTAL SCORE: 11
6. BECOMING EASILY ANNOYED OR IRRITABLE: NEARLY EVERY DAY
5. BEING SO RESTLESS THAT IT IS HARD TO SIT STILL: SEVERAL DAYS
4. TROUBLE RELAXING: MORE THAN HALF THE DAYS
IF YOU CHECKED OFF ANY PROBLEMS ON THIS QUESTIONNAIRE, HOW DIFFICULT HAVE THESE PROBLEMS MADE IT FOR YOU TO DO YOUR WORK, TAKE CARE OF THINGS AT HOME, OR GET ALONG WITH OTHER PEOPLE: SOMEWHAT DIFFICULT

## 2025-01-27 ASSESSMENT — PATIENT HEALTH QUESTIONNAIRE - PHQ9
5. POOR APPETITE OR OVEREATING: 3 - NEARLY EVERY DAY
CLINICAL INTERPRETATION OF PHQ2 SCORE: 3
SUM OF ALL RESPONSES TO PHQ QUESTIONS 1-9: 17

## 2025-01-27 NOTE — PROGRESS NOTES
[facility]   Initial Assessment      Name: Az Morris  MRN: [id number]  : 1995  Age: 29 y.o.  Date of assessment: 2025  PCP: Yun Watson P.A.-C.  Persons in attendance: Patient  Total session time: 50 minutes      CHIEF COMPLAINT AND HISTORY OF PRESENTING PROBLEM:  (as stated by Patient):  Az Morris is a 29 y.o., White male referred for assessment by No ref. provider found.  Primary presenting issue includes depression symptoms.       BEHAVIORAL HEALTH TREATMENT HISTORY  Does patient/parent report a history of prior behavioral health treatment for patient? Yes:  I did outpatient therapy last year and then stopped seeing that therapist because of a conflict of interest (she was my juanjose's mother)    History of untreated behavioral health issues identified? No  Does patient/parent report change in appetite or weight loss/gain? Yes, I feel like I've had a decrease appetite the last couple of weeks.   Does patient/parent report physical pain? No              FAMILY/SOCIAL HISTORY  Current living situation/household members: my wife Trudy of 3 years, and son Geo 2 y.o., 3 dogs, 3 horses, chickens, barn cat.  Does patient/parent report a family history of behavioral health issues, diagnoses, or treatment? Depression runs in my family, my mom has had a lot of depression, my sister was also diagnosed with major depressive disorder.   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 Yes, I also work parttime at [employer] to help get by and help my wife pay her way through nursing school.      HISTORY:  Does patient report current or past enlistment? Yes               Does patient report history of exposure to combat? No      SPIRITUAL/CULTURAL/IDENTITY:  What are the patient's/family's spiritual beliefs or practices? Episcopal, I read the bible and pray often, but dont attend Adventism. My wife was rasied jehovas witness and is wanting to start practicing again. I encouraged Az to talk to his wife to have an understanding of each others belief systems.       ABUSE/NEGLECT/TRAUMA SCREENING  Does patient report feeling “unsafe” in his/her home, or afraid of anyone? No  Does patient report any history of physical, sexual, or emotional abuse? No  Is there evidence of neglect by self? No    SAFETY ASSESSMENT - SELF  Does patient acknowledge current or past symptoms of dangerousness to self? Yes, last year I drove up to [facility] and almost shot myself until I thought to reach out to a friend who was in a similar situation and they talked me out of it. I am here today to address some potential depression before it gets more severe. No current SI.   Recent change in frequency/specificity/intensity of suicidal thoughts or self-harm behavior? No  Current access to firearms, medications, or other identified means of suicide/self-harm? Yes  If yes, willing to restrict access to means of suicide/self-harm? No, Az reports that he didn't get rid of firearms last year when experiencing SI due to sense of safety/ protection of family, and for hunting seasons. He is willing to tell me if SI returns and to come up with some kind of safety plan.     Current Suicide Risk: Low  Crisis Safety Plan completed and copy given to patient: No      SAFETY ASSESSMENT -  OTHERS  Recent change in frequency/specificity/intensity of thoughts or threats to harm others? No  If Yes:  Current access to firearms/other identified means of harm?   If yes, willing to restrict access to weapons/means of harm?     Current Homicide Risk:  Low  Crisis Safety Plan completed and copy given to patient? No  Based on information provided during the current assessment, is a mandated “duty to warn” being exercised? No      SUBSTANCE USE/ADDICTION HISTORY  Patient denies use of any substance/addictive behaviors No, I do the Zyn pouches and am addicted to nicotine, have been using those since June of last year, and before that I chewed [place] for at least 8 years prior.     If No:  Is there a family history of substance use/addiction? Yes  Does patient acknowledge or parent/significant other report use of/dependence on substances? Yes  Last time patient used alcohol: saturday  Within the past week? Yes  Last time patient used marijuana: none Within the past month? No  Any other addictive behavior reported (gambling, shopping, sex)? No       MENTAL STATUS/OBSERVATIONS              Participation: Active verbal participation, Attentive, Engaged, and Open to feedback  Grooming: Casual and Neat  Orientation:Alert and Fully Oriented   Behavior: Calm  Eye contact: Good          Mood:Euthymic  Affect:Flexible, Expansive, and Congruent with content  Thought process: Logical and Goal-directed  Thought content:  Within normal limits  Speech: Rate within normal limits and Volume within normal limits  Perception: Within normal limits  Memory: No gross evidence of memory deficits  Insight: Good  Judgment:  Good      Topics addressed in psychotherapy include: limits of confidentiality, intake questions, automatic negative thoughts Az has about himself as a  and at work, anger (luciana quote) , and wheel of emotions. Goal of therapy: To reduce severity and frequency of depression symptoms.     Care plan  completed: Yes  Does patient express agreement with the above plan? Yes     Diagnosis:  1. Moderate episode of recurrent major depressive disorder (HCC)        Referral appointment(s) scheduled? No, not interested in meeting with psychiatry at this time for medication evaluation       STEVEN Carmona.

## 2025-02-12 ENCOUNTER — OFFICE VISIT (OUTPATIENT)
Dept: MEDICAL GROUP | Facility: PHYSICIAN GROUP | Age: 30
End: 2025-02-12
Payer: OTHER GOVERNMENT

## 2025-02-12 VITALS
BODY MASS INDEX: 22.66 KG/M2 | HEART RATE: 86 BPM | HEIGHT: 69 IN | OXYGEN SATURATION: 98 % | SYSTOLIC BLOOD PRESSURE: 112 MMHG | WEIGHT: 153 LBS | TEMPERATURE: 97.9 F | DIASTOLIC BLOOD PRESSURE: 76 MMHG

## 2025-02-12 DIAGNOSIS — F33.1 MODERATE EPISODE OF RECURRENT MAJOR DEPRESSIVE DISORDER (HCC): ICD-10-CM

## 2025-02-12 PROCEDURE — 99214 OFFICE O/P EST MOD 30 MIN: CPT | Performed by: STUDENT IN AN ORGANIZED HEALTH CARE EDUCATION/TRAINING PROGRAM

## 2025-02-12 PROCEDURE — 3074F SYST BP LT 130 MM HG: CPT | Performed by: STUDENT IN AN ORGANIZED HEALTH CARE EDUCATION/TRAINING PROGRAM

## 2025-02-12 PROCEDURE — 3078F DIAST BP <80 MM HG: CPT | Performed by: STUDENT IN AN ORGANIZED HEALTH CARE EDUCATION/TRAINING PROGRAM

## 2025-02-12 ASSESSMENT — ENCOUNTER SYMPTOMS
HEADACHES: 0
SHORTNESS OF BREATH: 0
FEVER: 0
DIZZINESS: 0
CHILLS: 0

## 2025-02-12 ASSESSMENT — PATIENT HEALTH QUESTIONNAIRE - PHQ9
SUM OF ALL RESPONSES TO PHQ QUESTIONS 1-9: 14
5. POOR APPETITE OR OVEREATING: 3 - NEARLY EVERY DAY
CLINICAL INTERPRETATION OF PHQ2 SCORE: 2

## 2025-02-12 ASSESSMENT — FIBROSIS 4 INDEX: FIB4 SCORE: 0.48

## 2025-02-12 NOTE — PROGRESS NOTES
Subjective:     CC:   Chief Complaint   Patient presents with    Follow-Up       HPI:   Az presents today with    Problem   Moderate Episode of Recurrent Major Depressive Disorder (Hcc)    This is a chronic condition. Has never been on medication for management. He was following with a therapist but stopped seeing them for several months. Recently reestablished with a therapist at [facility]. He has history of suicidal ideation, last summer drove [place] [employer] with intention to shoot himself but reached out to a friend who talked him out of it. Denies SI currently. Has struggled on and off with passive suicidal thoughts the past few months, not currently. Developed safety plan with therapist. Has been reluctant to consider medication but is interested in establishing with a psychiatrist to discuss this option further. He lives with his wife and [id number] son.  He has good relationships with his friends and reports he has a few friends that he is open with about his depression.    Depression Screening  Little interest or pleasure in doing things?  1 - several days  Feeling down, depressed , or hopeless? 1 - several days  Trouble falling or staying asleep, or sleeping too much?  2 - more than half the days  Feeling tired or having little energy?  3 - nearly every day  Poor appetite or overeating?  3 - nearly every day  Feeling bad about yourself - or that you are a failure or have let yourself or your family down? 1 - several days  Trouble concentrating on things, such as reading the newspaper or watching television? 2 - more than half the days  Moving or speaking so slowly that other people could have noticed.  Or the opposite - being so fidgety or restless that you have been moving around a lot more than usual?  0 - not at all  Thoughts that you would be better off dead, or of hurting yourself?  1 - several days  Patient Health Questionnaire Score: 14    Interpretation of PHQ-9 Total Score   Score Severity  "  1-4 No Depression   5-9 Mild Depression   10-14 Moderate Depression   15-19 Moderately Severe Depression   20-27 Severe Depression               Past medical, surgical, family, and social history were reviewed and updated.     Medications:    Current Outpatient Medications:     omeprazole (PRILOSEC) 40 MG delayed-release capsule, Take 1 Capsule by mouth every day., Disp: 30 Capsule, Rfl: 2    Cholecalciferol (D3 PO), Take 1 Capsule by mouth every day., Disp: , Rfl:     famotidine (PEPCID) 40 MG Tab, Take 1 Tablet by mouth every day. Take at least every day for 1 week, then as needed, Disp: 30 Tablet, Rfl: 0    valacyclovir (VALTREX) 1 GM Tab, Take 2 tablets twice a day for 1 day when you have a cold sore. Start as soon as possible after onset., Disp: 12 Tablet, Rfl: 1    Allergies:  Penicillins      Health Maintenance: Completed    ROS:  Review of Systems   Constitutional:  Negative for chills and fever.   Respiratory:  Negative for shortness of breath.    Cardiovascular:  Negative for chest pain.   Neurological:  Negative for dizziness and headaches.       Objective:     Exam:  /76 (BP Location: Left arm, Patient Position: Sitting, BP Cuff Size: Adult)   Pulse 86   Temp 36.6 °C (97.9 °F) (Temporal)   Ht 1.753 m (5' 9\")   Wt 69.4 kg (153 lb)   SpO2 98%   BMI 22.59 kg/m²  Body mass index is 22.59 kg/m².    Physical Exam  Constitutional:       General: He is not in acute distress.  HENT:      Mouth/Throat:      Mouth: Mucous membranes are moist.   Eyes:      Extraocular Movements: Extraocular movements intact.   Pulmonary:      Effort: Pulmonary effort is normal. No respiratory distress.   Musculoskeletal:      Cervical back: Neck supple.   Skin:     Comments: No visible rashes   Neurological:      Mental Status: He is alert.   Psychiatric:         Mood and Affect: Mood normal.           Assessment & Plan:     29 y.o. male with the following -     1. Moderate episode of recurrent major depressive " disorder (HCC)  Chronic, unstable. No active suicidal ideation. He will continue to follow regularly with his therapist. Again recommend consideration of medication management, and options were discussed. Az does not wish to start medication today but is interested in meeting with a psychiatrist for medication evaluation. I have placed a referral.   - Referral to Behavioral Health          Return in about 3 months (around 5/12/2025) for depression.    Please note that this dictation was created using voice recognition software. I have made every reasonable attempt to correct obvious errors, but I expect that there are errors of grammar and possibly content that I did not discover before finalizing the note.

## 2025-02-14 NOTE — Clinical Note
REFERRAL APPROVAL NOTICE         Sent on February 14, 2025                   Az Morris  [address]  [place] [address]                   Dear Mr. Morris,    After a careful review of the medical information and benefit coverage, [facility] has processed your referral. See below for additional details.    If applicable, you must be actively enrolled with your insurance for coverage of the authorized service. If you have any questions regarding your coverage, please contact your insurance directly.    REFERRAL INFORMATION   Referral #:  [id number]  Referred-To Department    Referred-By Provider:  Behavioral Health    Yun Watson P.A.-C.   Behavioral Health Outpatient      [address]  [place] [address] [phone] [address]  [place] [address] [phone]    Referral Start Date:  02/12/2025  Referral End Date:   06/30/2025             SCHEDULING  If you do not already have an appointment, please call [phone] to make an appointment.     MORE INFORMATION  If you do not already have a [facility] account, sign up at: [email or web address]  You can access your medical information, make appointments, see lab results, billing information, and more.  If you have questions regarding this referral, please contact  the [facility] Referrals department at:             [phone]. Monday - Friday 8:00AM - 5:00PM.     Sincerely,    [facility]

## 2025-02-22 DIAGNOSIS — R10.13 EPIGASTRIC PAIN: ICD-10-CM

## 2025-02-24 ENCOUNTER — OFFICE VISIT (OUTPATIENT)
Dept: BEHAVIORAL HEALTH | Facility: CLINIC | Age: 30
End: 2025-02-24
Payer: OTHER GOVERNMENT

## 2025-02-24 DIAGNOSIS — F33.1 MODERATE EPISODE OF RECURRENT MAJOR DEPRESSIVE DISORDER (HCC): ICD-10-CM

## 2025-02-25 RX ORDER — OMEPRAZOLE 40 MG/1
40 CAPSULE, DELAYED RELEASE ORAL DAILY
Qty: 90 CAPSULE | Refills: 0 | Status: SHIPPED | OUTPATIENT
Start: 2025-02-25

## 2025-02-25 NOTE — PROGRESS NOTES
[facility]   Psychotherapy Progress Note        Name: Az Morris  MRN: [id number]  : 1995  Age: 29 y.o.  Date of assessment: 2025  PCP: Yun Watson P.A.-C.  Persons in attendance: Patient  Total session time: 50 minutes      Topics addressed in psychotherapy include: Az reports that his wife finished nursing school and her finals a week ago, and it was a stressful time between them up until that point, and then things were better between them after she completed her finals. He reports stressors include finances. Az works a full time job and a second part time job, and his wife will be starting a career in nursing in a few months. He feels that she spends on wants, and that Az would only spend money on necessities if it were up to him. For now since he is the only earner, he does not feel Trudy appreciates how hard he works to provide for the family. Az is concerned about her credit card debt, and that she maxed out her cards and spends his money freely. He feels she is not open to conversation about spending without feeling criticized and defensive. We reviewed Km's four horsemen, and Az will work on less criticism and less stonewallling. We also reviewed love languages for him to take quizzes home to take with Trudy as an opportunity to learn each others love languages and put more effort into expressing them.     Objective Observations:   Participation:Active verbal participation, Attentive, Engaged, and Open to feedback   Grooming:Casual and Neat   Cognition:Alert and Fully Oriented   Eye Contact:Good   Mood:Euthymic   Affect:Flexible, Expansive, and Congruent with content   Thought Process:Logical and Goal-directed   Speech:Rate within normal limits and Volume within normal limits    Current Risk:   Suicide: low   Homicide: low   Self-Harm: low    Care Plan Updated: Yes    Does patient express agreement with the above plan? Yes     Diagnosis:  1.  Moderate episode of recurrent major depressive disorder (HCC)        Referral appointment(s) scheduled? No       STEVEN Carmona.

## 2025-02-25 NOTE — TELEPHONE ENCOUNTER
Received request via: Patient    Patient comment: Need refills until follow up with digestive health on 4/25/25     Was the patient seen in the last year in this department? Yes    Does the patient have an active prescription (recently filled or refills available) for medication(s) requested? No    Pharmacy Name:     Does the patient have group home Plus and need 100-day supply? (This applies to ALL medications) Patient does not have SCP

## 2025-03-10 ENCOUNTER — OFFICE VISIT (OUTPATIENT)
Dept: BEHAVIORAL HEALTH | Facility: CLINIC | Age: 30
End: 2025-03-10
Payer: OTHER GOVERNMENT

## 2025-03-10 DIAGNOSIS — F33.1 MODERATE EPISODE OF RECURRENT MAJOR DEPRESSIVE DISORDER (HCC): ICD-10-CM

## 2025-03-10 PROCEDURE — 90834 PSYTX W PT 45 MINUTES: CPT | Performed by: MARRIAGE & FAMILY THERAPIST

## 2025-03-13 NOTE — PROGRESS NOTES
[facility]   Psychotherapy Progress Note        Name: Az Morris  MRN: [id number]  : 1995  Age: 29 y.o.  Date of assessment: 3/10/2025  PCP: Yun Watson P.A.-C.  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 We discussed the stressors this is creating for him and what ways he is being supported by his colleagues and employer. Az reports he doesn't have any emotional regulation or distress tolerance skills to practice when he starts to feel overwhelmed at work and loses motivation to stay productive. Intervention: we reviewed a grounding exercise, the wim hof breathing method, which az reports will be helpful for him to use at work as needed. We also discussed the love languages quiz that Az brought home to take with his wife, and what changes have occurred since then. He and his wife are both working on figuring out what specifically speaks to each of their love languages. I gave az some examples of ways he didn't realize he has already identified as ways he interprets love,and to start a list on a notes jocy with his wife of things they each enjoy.     Objective Observations:   Participation:Active verbal participation, Attentive, Engaged, and Open to feedback   Grooming:Casual and Neat   Cognition:Alert and Fully Oriented   Eye Contact:Good   Mood:Euthymic   Affect:Flexible, Expansive, and Congruent with content   Thought Process:Logical and Goal-directed   Speech:Rate within normal limits and Volume within normal limits    Current Risk:   Suicide: low   Homicide: low   Self-Harm: low     Care Plan Updated: Yes    Does patient express agreement with the above plan? Yes     Diagnosis:  1. Moderate episode of recurrent major depressive disorder (HCC)        Referral  appointment(s) scheduled? No       STEVEN Carmona.

## 2025-03-27 ENCOUNTER — OFFICE VISIT (OUTPATIENT)
Dept: BEHAVIORAL HEALTH | Facility: CLINIC | Age: 30
End: 2025-03-27
Payer: OTHER GOVERNMENT

## 2025-03-27 DIAGNOSIS — F33.1 MODERATE EPISODE OF RECURRENT MAJOR DEPRESSIVE DISORDER (HCC): ICD-10-CM

## 2025-03-27 PROCEDURE — 90834 PSYTX W PT 45 MINUTES: CPT | Performed by: MARRIAGE & FAMILY THERAPIST

## 2025-03-27 NOTE — PROGRESS NOTES
[facility]   Psychotherapy Progress Note        Name: Az Morris  MRN: [id number]  : 1995  Age: 29 y.o.  Date of assessment: 3/27/2025  PCP: Yun Watson P.A.-C.  Persons in attendance: Patient  Total session time: 50 minutes      Topics addressed in psychotherapy include: Az reports he was feeling overwhelmed last week at work and took a few days off to drive to some places in [place] where he could do some fly fishing to unwind and feel grounded again. We reflected on his self-awareness and I validated his decision to practice self-care. We reviewed his homework on examples of how his love languages are felt. We discussed his childhood and the relationship his parents had and what communication he liked in his parents marriage and what aspects of their marriage he didn't like. We compared these traits to his wifes parents and her upbringing to increase awareness of differences in experiences and how that impacts each of them in their marriage.  Az also identifies that 3/13/25 was the one year anniversary of when he almost attempted suicide. We reflected on his feelings about that and how it is difficult for him today in an improved mental state to understand the depth of his hopelessness at that time, and how he is glad he didn't go thru with it because of how it took time but things did eventually get better for him.     Objective Observations:   Participation:Active verbal participation, Attentive, Engaged, and Open to feedback   Grooming:Casual and Neat   Cognition:Alert and Fully Oriented   Eye Contact:Good   Mood:Euthymic   Affect:Flexible, Expansive, and Congruent with content   Thought Process:Logical and Goal-directed   Speech:Rate within normal limits and Volume within normal limits    Current Risk:   Suicide: low   Homicide: low   Self-Harm: low     Care Plan Updated: Yes    Does patient express agreement with the above plan? Yes     Diagnosis:  1. Moderate episode  of recurrent major depressive disorder (HCC)        Referral appointment(s) scheduled? Yes       STEVEN Carmona.

## 2025-04-23 ENCOUNTER — APPOINTMENT (OUTPATIENT)
Dept: BEHAVIORAL HEALTH | Facility: CLINIC | Age: 30
End: 2025-04-23
Payer: OTHER GOVERNMENT

## 2025-04-23 DIAGNOSIS — R10.13 EPIGASTRIC PAIN: ICD-10-CM

## 2025-04-24 ENCOUNTER — TELEMEDICINE (OUTPATIENT)
Dept: BEHAVIORAL HEALTH | Facility: CLINIC | Age: 30
End: 2025-04-24
Payer: OTHER GOVERNMENT

## 2025-04-24 DIAGNOSIS — F33.1 MAJOR DEPRESSIVE DISORDER, RECURRENT EPISODE, MODERATE (HCC): ICD-10-CM

## 2025-04-24 PROCEDURE — 90792 PSYCH DIAG EVAL W/MED SRVCS: CPT | Performed by: PSYCHIATRY & NEUROLOGY

## 2025-04-24 RX ORDER — CITALOPRAM HYDROBROMIDE 10 MG/1
10 TABLET ORAL NIGHTLY
Qty: 30 TABLET | Refills: 0 | Status: SHIPPED | OUTPATIENT
Start: 2025-04-24 | End: 2025-05-24

## 2025-04-24 NOTE — PROGRESS NOTES
[facility]   Therapy Progress Note  This evaluation was conducted via Teams using secure and encrypted videoconferencing technology. The patient was in a private location outside of their home in the [facility].    The patient's identity was confirmed and verbal consent was obtained for this virtual visit.    This visit was conducted via Zoom using secure and encrypted videoconferencing technology.  The patient was in a private location in the [facility].  The patient's identity was confirmed and verbal consent was obtained for this virtual visit.    This provider informed the patient their medical records are totally confidential except for the use by other providers involved in their care, or if the patient signs a release, or to report instances of child or elder abuse, or if it is determined they are an immediate risk to harm themselves or others.      Name: Az Morris  MRN: [id number]  : 1995  Age: 30 y.o.  Date of assessment: 2025  PCP: Yun Watson P.A.-C.      Present Illness:   Chart reviewed prior to the virtual visit in a secure location at his place of employment.  He is 30,  almost 3 years and has a 2-year-old son.  He is referred for evaluation of major depressive disorder, recurrent.  He is a college graduate with a business degree.  He was in [employer] for 12 years and is now employed as a [employer].  His very stressful.  He does worry about finances.  He is referred for an evaluation of recurrent depression which began at approximately age 20.  He has occasional insomnia problems.  Appetite fluctuates.  He also has some difficulty with decreased energy, concentration and motivation.  Memory is good.  He occasionally feels useless, worthless, and hopeless.  He felt suicidal 1 year ago and considered shooting himself.  He agrees to go to the emergency room if he were to feel actively suicidal.    Past Psych History:   Psychiatric  hospitalization or contact with a psychiatrist.  No previous psychiatric hospitalization.  He has seen a psychotherapist at  3 or 4 times.  [facility]    Substance Abuse History:  No alcohol or substance abuse problems.  He has 1 beer per month.    Family History:   His mother has had a problem with depression and is now sober for 3 years.  His in [facility] and has had problems with depression.    Medical History:  Thoracic spine pain and bilateral knee pain.  He sustained a left eye injury 2 years ago.    Psych Medications:  No previous psychiatric medication.    Allergies:   Penicillin    Mental Status:   Alert understood, and cooperative.  Relatedness is good.  Grooming is good.  Speech is normal rate.  Sad and anxious.  Memory is good.  Insight and judgment are good.  No indication of psychotic thinking.    Current Risk:       Suicidal: Not suicidal       Homicidal: Not homicidal       Self-Harm: No plan to harm self       Relapse (Low/Moderate/High): Moderate       Crisis Safety Plan Reviewed: Discussed with patient    Diagnosis:  Major depressive disorder, recurrent    Treatment Plan:  The current treatment plan consists of a follow-up visit in 3 weeks and then monthly psychiatric sessions designed to evaluate his depression.    Duration cannot be determined at this time.    Goals: Remission of depression in order to prevent relapse due to the chronic nature of his behavioral health problems and mental illness.  Start Celexa 10 mg at bedtime.  Possible side effects including drowsiness discussed      Ramses Greenfield M.D.     This note was created using voice recognition software (Dragon). The accuracy of the dictation is limited by the abilities of the software. I have reviewed the note prior to signing, however some errors in grammar and context are still possible. If you have any questions related to this note please do not hesitate to contact our office.

## 2025-04-25 NOTE — Clinical Note
REFERRAL APPROVAL NOTICE         Sent on April 25, 2025                   [place] [address]                   Dear Mr. Morris,    After a careful review of the medical information and benefit coverage, [facility] has processed your referral. See below for additional details.    If applicable, you must be actively enrolled with your insurance for coverage of the authorized service. If you have any questions regarding your coverage, please contact your insurance directly.    REFERRAL INFORMATION   Referral #:  [id number]  Referred-To Provider    Referred-By Provider:  Gastroenterology    Yun Watson P.A.-C.   [facility]      [address] [id number] [phone] [address] [place] [id number] [phone]    Referral Start Date:  04/23/2025  Referral End Date:   06/30/2025             SCHEDULING  If you do not already have an appointment, please call [phone] to make an appointment.     MORE INFORMATION  If you do not already have a [facility] account, sign up at: [email or web address]  You can access your medical information, make appointments, see lab results, billing information, and more.  If you have questions regarding this referral, please contact  the [facility] Referrals department at:             [phone]. Monday - Friday 8:00AM - 5:00PM.     Sincerely,    [facility]

## 2025-05-07 ENCOUNTER — OFFICE VISIT (OUTPATIENT)
Dept: BEHAVIORAL HEALTH | Facility: CLINIC | Age: 30
End: 2025-05-07
Payer: OTHER GOVERNMENT

## 2025-05-07 DIAGNOSIS — F33.41 RECURRENT MAJOR DEPRESSIVE DISORDER, IN PARTIAL REMISSION (HCC): ICD-10-CM

## 2025-05-07 PROCEDURE — 90834 PSYTX W PT 45 MINUTES: CPT | Performed by: MARRIAGE & FAMILY THERAPIST

## 2025-05-14 ENCOUNTER — TELEMEDICINE (OUTPATIENT)
Dept: BEHAVIORAL HEALTH | Facility: CLINIC | Age: 30
End: 2025-05-14
Payer: OTHER GOVERNMENT

## 2025-05-14 ENCOUNTER — APPOINTMENT (OUTPATIENT)
Dept: MEDICAL GROUP | Facility: PHYSICIAN GROUP | Age: 30
End: 2025-05-14
Payer: OTHER GOVERNMENT

## 2025-05-14 DIAGNOSIS — F33.1 MAJOR DEPRESSIVE DISORDER, RECURRENT EPISODE, MODERATE (HCC): Primary | ICD-10-CM

## 2025-05-14 PROCEDURE — 99214 OFFICE O/P EST MOD 30 MIN: CPT | Mod: 95 | Performed by: PSYCHIATRY & NEUROLOGY

## 2025-05-14 NOTE — PROGRESS NOTES
[facility]   Follow Up Assessment    This visit was conducted via Zoom using secure and encrypted videoconferencing technology.  The patient was in a private location in the [facility].  The patient's identity was confirmed and verbal consent was obtained for this virtual visit.    This provider informed the patient their medical records are totally confidential except for the use by other providers involved in their care, or if the patient signs a release, or to report instances of child or elder abuse, or if it is determined they are an immediate risk to harm themselves or others.    Name: Az [place]  MRN: [id number]  : 1995  Age: 30 y.o.  Date of assessment: 2025  PCP: Yun Watson P.A.-C.    Subjective:  Chart reviewed prior to the virtual visit in his home.  He was in the [employer] for 12 years and is now in the [employer].  He is a college graduate.  His son has been ill and possibly Az is having similar symptoms.  Celexa 10 mg at bedtime is causing daytime drowsiness and he would prefer to try something that was more energizing.  Discontinue Celexa 10 mg at bedtime and start desipramine 25 mg a.m.    Objective:  He is alert, oriented, and cooperative.  Relatedness is good.  Responses are brief but appropriate.  Grooming is good.  Speech is normal rate.  Memory is good.  Insight and judgment are good.  No indication of psychotic thinking.    Current Risk:       Suicidal: Not suicidal       Homicidal: Not homicidal       Self-Harm: No plan to harm self       Relapse(Low/Moderate/High):: Moderate       Crisis Safety Plan Reviewed: Discussed with patient    Diagnosis:   Major depressive disorder, recurrent    Treatment Plan:  The current treatment plan consists of a follow-up in 3 weeks and then monthly psychiatric sessions designed to evaluate his depression.    Duration will be for a minimum of 12 months and will be reviewed at each visit.    Goals: Remission of  depression in order to prevent relapse due to the chronic nature of his behavioral health problems and mental illness.  Discontinue Celexa 10 mg at bedtime and start desipramine 25 mg a.m. but it could be increased to 50 mg total a.m. if he has no significant side effects.      Ramses Greenfield M.D.    This note was created using voice recognition software (Dragon). The accuracy of the dictation is limited by the abilities of the software. I have reviewed the note prior to signing, however some errors in grammar and context are still possible. If you have any questions related to this note please do not hesitate to contact our office.

## 2025-06-02 ENCOUNTER — TELEMEDICINE (OUTPATIENT)
Dept: BEHAVIORAL HEALTH | Facility: CLINIC | Age: 30
End: 2025-06-02
Payer: OTHER GOVERNMENT

## 2025-06-02 DIAGNOSIS — F33.1 MAJOR DEPRESSIVE DISORDER, RECURRENT EPISODE, MODERATE (HCC): Primary | ICD-10-CM

## 2025-06-02 PROCEDURE — 99214 OFFICE O/P EST MOD 30 MIN: CPT | Mod: 95 | Performed by: PSYCHIATRY & NEUROLOGY

## 2025-06-02 RX ORDER — DESIPRAMINE HYDROCHLORIDE 25 MG/1
25 TABLET ORAL DAILY
Qty: 30 TABLET | Refills: 0 | Status: SHIPPED | OUTPATIENT
Start: 2025-06-02 | End: 2025-07-02
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
## 2025-06-05 RX ORDER — CITALOPRAM HYDROBROMIDE 10 MG/1
10 TABLET ORAL EVERY EVENING
Qty: 30 TABLET | Refills: 0 | Status: SHIPPED | OUTPATIENT
Start: 2025-06-05

## 2025-07-03 ENCOUNTER — TELEMEDICINE (OUTPATIENT)
Dept: BEHAVIORAL HEALTH | Facility: CLINIC | Age: 30
End: 2025-07-03
Payer: OTHER GOVERNMENT

## 2025-07-03 DIAGNOSIS — F33.1 MAJOR DEPRESSIVE DISORDER, RECURRENT EPISODE, MODERATE (HCC): Primary | ICD-10-CM

## 2025-07-03 PROCEDURE — 99214 OFFICE O/P EST MOD 30 MIN: CPT | Mod: 95 | Performed by: PSYCHIATRY & NEUROLOGY

## 2025-07-03 RX ORDER — CITALOPRAM HYDROBROMIDE 10 MG/1
10 TABLET ORAL EVERY EVENING
Qty: 90 TABLET | Refills: 0 | Status: SHIPPED | OUTPATIENT
Start: 2025-07-03 | End: 2025-10-01
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
